# Patient Record
Sex: FEMALE | Race: WHITE | NOT HISPANIC OR LATINO | Employment: UNEMPLOYED | URBAN - METROPOLITAN AREA
[De-identification: names, ages, dates, MRNs, and addresses within clinical notes are randomized per-mention and may not be internally consistent; named-entity substitution may affect disease eponyms.]

---

## 2023-08-16 LAB
ALANINE AMINOTRANSFERASE (SGPT) (U/L) IN SER/PLAS: 23 U/L (ref 7–45)
ALBUMIN (G/DL) IN SER/PLAS: 4.1 G/DL (ref 3.4–5)
ALKALINE PHOSPHATASE (U/L) IN SER/PLAS: 83 U/L (ref 33–110)
ANION GAP IN SER/PLAS: 11 MMOL/L (ref 10–20)
ASPARTATE AMINOTRANSFERASE (SGOT) (U/L) IN SER/PLAS: 17 U/L (ref 9–39)
BASOPHILS (10*3/UL) IN BLOOD BY AUTOMATED COUNT: 0.04 X10E9/L (ref 0–0.1)
BASOPHILS/100 LEUKOCYTES IN BLOOD BY AUTOMATED COUNT: 0.7 % (ref 0–2)
BILIRUBIN TOTAL (MG/DL) IN SER/PLAS: 0.6 MG/DL (ref 0–1.2)
CALCIUM (MG/DL) IN SER/PLAS: 9.6 MG/DL (ref 8.6–10.3)
CARBON DIOXIDE, TOTAL (MMOL/L) IN SER/PLAS: 27 MMOL/L (ref 21–32)
CHLORIDE (MMOL/L) IN SER/PLAS: 105 MMOL/L (ref 98–107)
CHOLESTEROL (MG/DL) IN SER/PLAS: 158 MG/DL (ref 0–199)
CHOLESTEROL IN HDL (MG/DL) IN SER/PLAS: 43.6 MG/DL
CHOLESTEROL/HDL RATIO: 3.6
CREATININE (MG/DL) IN SER/PLAS: 0.59 MG/DL (ref 0.5–1.05)
EOSINOPHILS (10*3/UL) IN BLOOD BY AUTOMATED COUNT: 0.08 X10E9/L (ref 0–0.7)
EOSINOPHILS/100 LEUKOCYTES IN BLOOD BY AUTOMATED COUNT: 1.4 % (ref 0–6)
ERYTHROCYTE DISTRIBUTION WIDTH (RATIO) BY AUTOMATED COUNT: 14.9 % (ref 11.5–14.5)
ERYTHROCYTE MEAN CORPUSCULAR HEMOGLOBIN CONCENTRATION (G/DL) BY AUTOMATED: 31 G/DL (ref 32–36)
ERYTHROCYTE MEAN CORPUSCULAR VOLUME (FL) BY AUTOMATED COUNT: 80 FL (ref 80–100)
ERYTHROCYTES (10*6/UL) IN BLOOD BY AUTOMATED COUNT: 5.22 X10E12/L (ref 4–5.2)
GFR FEMALE: >90 ML/MIN/1.73M2
GLUCOSE (MG/DL) IN SER/PLAS: 121 MG/DL (ref 74–99)
HEMATOCRIT (%) IN BLOOD BY AUTOMATED COUNT: 41.6 % (ref 36–46)
HEMOGLOBIN (G/DL) IN BLOOD: 12.9 G/DL (ref 12–16)
IMMATURE GRANULOCYTES/100 LEUKOCYTES IN BLOOD BY AUTOMATED COUNT: 0.2 % (ref 0–0.9)
LDL: 89 MG/DL (ref 0–99)
LEUKOCYTES (10*3/UL) IN BLOOD BY AUTOMATED COUNT: 5.9 X10E9/L (ref 4.4–11.3)
LYMPHOCYTES (10*3/UL) IN BLOOD BY AUTOMATED COUNT: 2.08 X10E9/L (ref 1.2–4.8)
LYMPHOCYTES/100 LEUKOCYTES IN BLOOD BY AUTOMATED COUNT: 35.6 % (ref 13–44)
MONOCYTES (10*3/UL) IN BLOOD BY AUTOMATED COUNT: 0.46 X10E9/L (ref 0.1–1)
MONOCYTES/100 LEUKOCYTES IN BLOOD BY AUTOMATED COUNT: 7.9 % (ref 2–10)
NEUTROPHILS (10*3/UL) IN BLOOD BY AUTOMATED COUNT: 3.18 X10E9/L (ref 1.2–7.7)
NEUTROPHILS/100 LEUKOCYTES IN BLOOD BY AUTOMATED COUNT: 54.2 % (ref 40–80)
NRBC (PER 100 WBCS) BY AUTOMATED COUNT: 0 /100 WBC (ref 0–0)
PLATELETS (10*3/UL) IN BLOOD AUTOMATED COUNT: 209 X10E9/L (ref 150–450)
POTASSIUM (MMOL/L) IN SER/PLAS: 4 MMOL/L (ref 3.5–5.3)
PROTEIN TOTAL: 7.2 G/DL (ref 6.4–8.2)
SODIUM (MMOL/L) IN SER/PLAS: 139 MMOL/L (ref 136–145)
TRIGLYCERIDE (MG/DL) IN SER/PLAS: 126 MG/DL (ref 0–149)
UREA NITROGEN (MG/DL) IN SER/PLAS: 15 MG/DL (ref 6–23)
VLDL: 25 MG/DL (ref 0–40)

## 2023-10-04 ENCOUNTER — TREATMENT (OUTPATIENT)
Dept: PHYSICAL THERAPY | Facility: CLINIC | Age: 53
End: 2023-10-04
Payer: COMMERCIAL

## 2023-10-04 DIAGNOSIS — M79.671 PAIN IN BOTH FEET: ICD-10-CM

## 2023-10-04 DIAGNOSIS — M54.16 LUMBAR RADICULOPATHY: Primary | ICD-10-CM

## 2023-10-04 DIAGNOSIS — M79.672 PAIN IN BOTH FEET: ICD-10-CM

## 2023-10-04 DIAGNOSIS — M75.52 BURSITIS OF LEFT SHOULDER: ICD-10-CM

## 2023-10-04 DIAGNOSIS — S43.439D: ICD-10-CM

## 2023-10-04 DIAGNOSIS — M25.512 ACUTE PAIN OF LEFT SHOULDER: ICD-10-CM

## 2023-10-04 PROCEDURE — 97035 APP MDLTY 1+ULTRASOUND EA 15: CPT | Mod: GP

## 2023-10-04 PROCEDURE — 97140 MANUAL THERAPY 1/> REGIONS: CPT | Mod: GP

## 2023-10-04 ASSESSMENT — PAIN SCALES - GENERAL: PAINLEVEL_OUTOF10: 5 - MODERATE PAIN

## 2023-10-04 ASSESSMENT — PAIN - FUNCTIONAL ASSESSMENT: PAIN_FUNCTIONAL_ASSESSMENT: 0-10

## 2023-10-04 ASSESSMENT — PAIN DESCRIPTION - DESCRIPTORS: DESCRIPTORS: SHARP;ACHING

## 2023-10-04 NOTE — PROGRESS NOTES
Physical Therapy    Physical Therapy Treatment    Patient Name: Ludmila Chowdary  MRN: [id number]  Today's Date: 10/4/2023  Time Calculation  Start Time: 0220  Stop Time: 0250  Time Calculation (min): 30 min      Visit #: 13  Insurance Reviewed (per information provided by  pre-cert team)  Authorization required:  Y/N  Approved # of visits: 12  Authorization date range:  11  Preferred Name:    Script:      Assessment:  PT Assessment  PT Assessment Results: Pain (No adversity to US treatment or manual treatment)  Assessment Comment:  (Patient did well with today's treatment without adverse effects from conservative treatment of manual, modalities, and taping. Pt. will benefit from continued skilled PT to address all areas of pain from land and water.)    Plan:  OP PT Plan  Treatment/Interventions: Aquatic therapy, Manual therapy, Neuromuscular re-education, Taping techniques, Therapeutic exercises, Ultrasound  PT Plan: Skilled PT  PT Frequency: 2 times per week  Duration: 6 weeks  Plan of Care Agreement: Patient  Continue with poc as documented in the [employer] system     Current Problem  1. Lumbar radiculopathy        2. Bursitis of left shoulder  Referral to Physical Therapy      3. Pain in both feet        4. Acute pain of left shoulder        5. Labral tear of shoulder, unspecified laterality, subsequent encounter            Subjective   General Limited treatment time today due to Epic issues from registration stand point.  Chose to work on right foot on land today per patient request, as this is her most painful area today.     Precautions: No falls since last visit.  Vital Signs     Pain  Pain Assessment: 0-10  Pain Score: 5 - Moderate pain  Pain Type: Chronic pain (Specifically today the right anterior and medial ankle)  Pain Location: Ankle  Pain Descriptors: Sharp, Aching  Effect of Pain on Daily Activities:  (Worsened with standing and baking/cleaning)    Objective   Observe area of pain to be left arch which has  severe pes planus   Palpable tenderness to left medial arch and along the PTT tendon region of left foot.    Treatments:  Therapeutic Exercise  Therapeutic Exercise Performed: Yes  Therapeutic Exercise Activity 1: towel scrunches, 10x  Therapeutic Exercise Activity 2: Seated ball squeeze between heels while doing heel raises, 10x (Total ex. 5 min)    Manual Therapy  Manual Therapy Activity 1: STM to right medial arch/ankle and up into posterior tibial tendon x 15  Manual Therapy Activity 2: Applied trial of arch taping    Modalities  Modality 1: Timed Ultrasound (1.5 w/cm2, pulsed setting to right medial arch and into distal posterior tibial tendon)    OP EDUCATION:  Education  Individual(s) Educated: Patient  Education Provided: Other (Exercises to perform at home for foot and ankle)

## 2023-10-05 PROBLEM — B96.81 HELICOBACTER PYLORI GASTRITIS: Status: ACTIVE | Noted: 2023-10-05

## 2023-10-05 PROBLEM — M19.90 DJD (DEGENERATIVE JOINT DISEASE): Status: ACTIVE | Noted: 2023-10-05

## 2023-10-05 PROBLEM — M54.2 CHRONIC NECK PAIN: Status: ACTIVE | Noted: 2023-10-05

## 2023-10-05 PROBLEM — M72.2 PLANTAR FASCIITIS, RIGHT: Status: ACTIVE | Noted: 2023-10-05

## 2023-10-05 PROBLEM — K21.9 GERD (GASTROESOPHAGEAL REFLUX DISEASE): Status: ACTIVE | Noted: 2023-10-05

## 2023-10-05 PROBLEM — E66.811 CLASS 1 OBESITY WITH ALVEOLAR HYPOVENTILATION AND BODY MASS INDEX (BMI) OF 31.0 TO 31.9 IN ADULT: Status: ACTIVE | Noted: 2023-10-05

## 2023-10-05 PROBLEM — M79.673 HEEL PAIN: Status: ACTIVE | Noted: 2023-10-05

## 2023-10-05 PROBLEM — H16.223 KERATOCONJUNCTIVITIS SICCA OF BOTH EYES NOT DUE TO SJOGREN'S SYNDROME: Status: ACTIVE | Noted: 2023-10-05

## 2023-10-05 PROBLEM — D50.9 IRON DEFICIENCY ANEMIA: Status: ACTIVE | Noted: 2023-10-05

## 2023-10-05 PROBLEM — M79.10 MYALGIA: Status: ACTIVE | Noted: 2023-10-05

## 2023-10-05 PROBLEM — M75.52 ACUTE SHOULDER BURSITIS, LEFT: Status: ACTIVE | Noted: 2023-10-05

## 2023-10-05 PROBLEM — M25.561 BILATERAL KNEE PAIN: Status: ACTIVE | Noted: 2021-09-30

## 2023-10-05 PROBLEM — H02.88B MEIBOMIAN GLAND DYSFUNCTION (MGD) OF UPPER AND LOWER EYELID OF LEFT EYE: Status: ACTIVE | Noted: 2023-10-05

## 2023-10-05 PROBLEM — I10 BENIGN ESSENTIAL HYPERTENSION: Status: ACTIVE | Noted: 2023-10-05

## 2023-10-05 PROBLEM — M12.9 ARTHROPATHY: Status: ACTIVE | Noted: 2023-10-05

## 2023-10-05 PROBLEM — J01.90 ACUTE SINUSITIS: Status: ACTIVE | Noted: 2023-10-05

## 2023-10-05 PROBLEM — G47.00 INSOMNIA: Status: ACTIVE | Noted: 2023-10-05

## 2023-10-05 PROBLEM — M25.661 DECREASED RANGE OF MOTION (ROM) OF RIGHT KNEE: Status: ACTIVE | Noted: 2021-05-30

## 2023-10-05 PROBLEM — G89.29 CHRONIC BILATERAL LOW BACK PAIN WITHOUT SCIATICA: Status: ACTIVE | Noted: 2020-11-09

## 2023-10-05 PROBLEM — H92.09 OTALGIA: Status: ACTIVE | Noted: 2023-10-05

## 2023-10-05 PROBLEM — E78.00 HYPERCHOLESTEREMIA: Status: ACTIVE | Noted: 2023-10-05

## 2023-10-05 PROBLEM — R06.09 DOE (DYSPNEA ON EXERTION): Status: ACTIVE | Noted: 2023-10-05

## 2023-10-05 PROBLEM — M79.672 FOOT PAIN, BILATERAL: Status: ACTIVE | Noted: 2023-10-05

## 2023-10-05 PROBLEM — G89.29 CHRONIC NECK PAIN: Status: ACTIVE | Noted: 2023-10-05

## 2023-10-05 PROBLEM — E66.9 OBESITY, CLASS I, BMI 30-34.9: Status: ACTIVE | Noted: 2019-03-15

## 2023-10-05 PROBLEM — R26.89 ANTALGIC GAIT: Status: ACTIVE | Noted: 2023-10-05

## 2023-10-05 PROBLEM — K29.70 HELICOBACTER PYLORI GASTRITIS: Status: ACTIVE | Noted: 2023-10-05

## 2023-10-05 PROBLEM — M54.9 BACK PAIN: Status: ACTIVE | Noted: 2023-10-05

## 2023-10-05 PROBLEM — R35.89 POLYURIA: Status: ACTIVE | Noted: 2023-10-05

## 2023-10-05 PROBLEM — M94.0 COSTOCHONDRITIS: Status: ACTIVE | Noted: 2023-10-05

## 2023-10-05 PROBLEM — H02.88A MEIBOMIAN GLAND DYSFUNCTION (MGD) OF UPPER AND LOWER EYELID OF RIGHT EYE: Status: ACTIVE | Noted: 2023-10-05

## 2023-10-05 PROBLEM — E66.2 CLASS 1 OBESITY WITH ALVEOLAR HYPOVENTILATION AND BODY MASS INDEX (BMI) OF 31.0 TO 31.9 IN ADULT (MULTI): Status: ACTIVE | Noted: 2023-10-05

## 2023-10-05 PROBLEM — M77.30 CALCANEAL SPUR: Status: ACTIVE | Noted: 2023-10-05

## 2023-10-05 PROBLEM — D64.9 ANEMIA: Status: ACTIVE | Noted: 2023-10-05

## 2023-10-05 PROBLEM — R53.1 WEAKNESS: Status: ACTIVE | Noted: 2023-10-05

## 2023-10-05 PROBLEM — E11.9 DIABETES MELLITUS, NEW ONSET (MULTI): Status: ACTIVE | Noted: 2023-10-05

## 2023-10-05 PROBLEM — M25.562 BILATERAL KNEE PAIN: Status: ACTIVE | Noted: 2021-09-30

## 2023-10-05 PROBLEM — R32 URINE INCONTINENCE: Status: ACTIVE | Noted: 2023-10-05

## 2023-10-05 PROBLEM — M47.812 CERVICAL SPONDYLOSIS: Status: ACTIVE | Noted: 2023-10-05

## 2023-10-05 PROBLEM — E55.9 VITAMIN D DEFICIENCY: Status: ACTIVE | Noted: 2023-10-05

## 2023-10-05 PROBLEM — M54.50 CHRONIC BILATERAL LOW BACK PAIN WITHOUT SCIATICA: Status: ACTIVE | Noted: 2020-11-09

## 2023-10-05 PROBLEM — H52.13 MYOPIA OF BOTH EYES: Status: ACTIVE | Noted: 2023-10-05

## 2023-10-05 PROBLEM — R30.0 DYSURIA: Status: ACTIVE | Noted: 2023-10-05

## 2023-10-05 PROBLEM — M79.671 FOOT PAIN, BILATERAL: Status: ACTIVE | Noted: 2023-10-05

## 2023-10-05 PROBLEM — M25.50 POLYARTHRALGIA: Status: ACTIVE | Noted: 2023-10-05

## 2023-10-05 PROBLEM — M25.569 KNEE PAIN: Status: ACTIVE | Noted: 2021-05-30

## 2023-10-05 PROBLEM — M75.81 TENDINITIS OF RIGHT ROTATOR CUFF: Status: ACTIVE | Noted: 2023-10-05

## 2023-10-05 PROBLEM — M72.2 PLANTAR FASCIITIS, LEFT: Status: ACTIVE | Noted: 2023-10-05

## 2023-10-05 PROBLEM — M17.0 ARTHRITIS OF BOTH KNEES: Status: ACTIVE | Noted: 2023-10-05

## 2023-10-05 PROBLEM — H35.033 HYPERTENSIVE RETINOPATHY OF BOTH EYES, GRADE 1: Status: ACTIVE | Noted: 2023-10-05

## 2023-10-05 PROBLEM — M76.821 POSTERIOR TIBIAL TENDINITIS, RIGHT LEG: Status: ACTIVE | Noted: 2023-10-05

## 2023-10-05 PROBLEM — M47.27 LUMBOSACRAL RADICULOPATHY DUE TO OSTEOARTHRITIS OF SPINE: Status: ACTIVE | Noted: 2023-10-05

## 2023-10-05 PROBLEM — R26.9 GAIT DIFFICULTY: Status: ACTIVE | Noted: 2021-05-30

## 2023-10-05 PROBLEM — M76.822 POSTERIOR TIBIAL TENDINITIS, LEFT: Status: ACTIVE | Noted: 2023-10-05

## 2023-10-05 PROBLEM — J30.9 ALLERGIC RHINITIS: Status: ACTIVE | Noted: 2023-10-05

## 2023-10-05 PROBLEM — M17.12 PRIMARY OSTEOARTHRITIS OF LEFT KNEE: Status: ACTIVE | Noted: 2021-05-30

## 2023-10-05 PROBLEM — E78.5 HYPERLIPIDEMIA: Status: ACTIVE | Noted: 2023-10-05

## 2023-10-05 PROBLEM — R13.19 ESOPHAGEAL DYSPHAGIA: Status: ACTIVE | Noted: 2023-10-05

## 2023-10-05 PROBLEM — M21.41 ACQUIRED PES PLANUS OF BOTH FEET: Status: ACTIVE | Noted: 2023-10-05

## 2023-10-05 PROBLEM — M21.40 FLAT FOOT: Status: ACTIVE | Noted: 2023-10-05

## 2023-10-05 PROBLEM — E66.811 OBESITY, CLASS I, BMI 30-34.9: Status: ACTIVE | Noted: 2019-03-15

## 2023-10-05 PROBLEM — M54.12 CERVICAL RADICULITIS: Status: ACTIVE | Noted: 2023-10-05

## 2023-10-05 PROBLEM — M06.4: Status: ACTIVE | Noted: 2023-10-05

## 2023-10-05 PROBLEM — M21.42 ACQUIRED PES PLANUS OF BOTH FEET: Status: ACTIVE | Noted: 2023-10-05

## 2023-10-05 PROBLEM — R07.9 CHEST PAIN: Status: ACTIVE | Noted: 2023-10-05

## 2023-10-05 PROBLEM — R29.898 DECREASED STRENGTH INVOLVING KNEE JOINT: Status: ACTIVE | Noted: 2021-05-30

## 2023-10-05 PROBLEM — H52.03 HYPERMETROPIA OF BOTH EYES: Status: ACTIVE | Noted: 2023-10-05

## 2023-10-05 PROBLEM — H25.13 CATARACT, NUCLEAR SCLEROTIC, BOTH EYES: Status: ACTIVE | Noted: 2023-10-05

## 2023-10-05 RX ORDER — DICLOFENAC SODIUM 20 MG/G
2 SOLUTION TOPICAL EVERY 12 HOURS PRN
COMMUNITY
Start: 2017-12-26 | End: 2023-11-27 | Stop reason: WASHOUT

## 2023-10-05 RX ORDER — ALBUTEROL SULFATE 90 UG/1
2 AEROSOL, METERED RESPIRATORY (INHALATION) EVERY 8 HOURS PRN
COMMUNITY
Start: 2020-07-28 | End: 2024-03-01 | Stop reason: ALTCHOICE

## 2023-10-05 RX ORDER — CEFUROXIME AXETIL 500 MG/1
500 TABLET ORAL EVERY 12 HOURS
COMMUNITY
Start: 2023-08-09 | End: 2023-11-16 | Stop reason: ALTCHOICE

## 2023-10-05 RX ORDER — LISINOPRIL 10 MG/1
TABLET ORAL
COMMUNITY
End: 2024-03-01 | Stop reason: ALTCHOICE

## 2023-10-05 RX ORDER — LIDOCAINE HYDROCHLORIDE 20 MG/ML
SOLUTION ORAL; TOPICAL
COMMUNITY
Start: 2023-01-18 | End: 2024-03-01 | Stop reason: ALTCHOICE

## 2023-10-05 RX ORDER — BETAMETHASONE DIPROPIONATE 0.5 MG/G
CREAM TOPICAL 2 TIMES DAILY
COMMUNITY
Start: 2019-06-19 | End: 2024-03-01 | Stop reason: ALTCHOICE

## 2023-10-05 RX ORDER — OMEPRAZOLE 40 MG/1
1 CAPSULE, DELAYED RELEASE ORAL DAILY
COMMUNITY
Start: 2017-02-01 | End: 2023-11-16 | Stop reason: ALTCHOICE

## 2023-10-05 RX ORDER — MELOXICAM 15 MG/1
1 TABLET ORAL DAILY
COMMUNITY
Start: 2022-03-14 | End: 2023-11-27 | Stop reason: WASHOUT

## 2023-10-05 RX ORDER — ATENOLOL 50 MG/1
50 TABLET ORAL DAILY
COMMUNITY
Start: 2023-07-25 | End: 2023-10-23 | Stop reason: SDUPTHER

## 2023-10-05 RX ORDER — ERGOCALCIFEROL 1.25 MG/1
CAPSULE ORAL
COMMUNITY
Start: 2019-08-27

## 2023-10-05 RX ORDER — TOLTERODINE TARTRATE 2 MG/1
1 TABLET, EXTENDED RELEASE ORAL NIGHTLY
COMMUNITY
Start: 2023-01-18 | End: 2024-03-01 | Stop reason: ALTCHOICE

## 2023-10-05 RX ORDER — CETIRIZINE HYDROCHLORIDE 10 MG/1
10 TABLET ORAL DAILY PRN
COMMUNITY
Start: 2020-03-16 | End: 2023-11-27 | Stop reason: WASHOUT

## 2023-10-05 RX ORDER — PANTOPRAZOLE SODIUM 40 MG/1
40 TABLET, DELAYED RELEASE ORAL DAILY
COMMUNITY
End: 2024-05-01 | Stop reason: SINTOL

## 2023-10-05 RX ORDER — CHOLECALCIFEROL (VITAMIN D3) 50 MCG
TABLET ORAL
COMMUNITY
End: 2023-11-27 | Stop reason: WASHOUT

## 2023-10-05 RX ORDER — DOXYCYCLINE HYCLATE 100 MG
1 TABLET ORAL EVERY 12 HOURS
COMMUNITY
Start: 2022-12-05 | End: 2023-11-27 | Stop reason: WASHOUT

## 2023-10-05 RX ORDER — SOD SULF/POT CHLORIDE/MAG SULF 1.479 G
TABLET ORAL
COMMUNITY
End: 2024-03-01 | Stop reason: ALTCHOICE

## 2023-10-05 RX ORDER — CALCIUM CITRATE/VITAMIN D3 200MG-6.25
TABLET ORAL DAILY
COMMUNITY
Start: 2020-04-24

## 2023-10-05 RX ORDER — METFORMIN HYDROCHLORIDE 500 MG/1
500 TABLET ORAL EVERY 12 HOURS
COMMUNITY
End: 2024-03-01 | Stop reason: ALTCHOICE

## 2023-10-05 RX ORDER — AMOXICILLIN AND CLAVULANATE POTASSIUM 875; 125 MG/1; MG/1
1 TABLET, FILM COATED ORAL 2 TIMES DAILY
COMMUNITY
Start: 2023-05-04 | End: 2023-10-23 | Stop reason: ALTCHOICE

## 2023-10-05 RX ORDER — GUAIFENESIN 400 MG/1
TABLET ORAL
COMMUNITY
Start: 2023-05-05 | End: 2024-03-01 | Stop reason: ALTCHOICE

## 2023-10-05 RX ORDER — AMLODIPINE BESYLATE 5 MG/1
5 TABLET ORAL
COMMUNITY
Start: 2020-11-04 | End: 2023-10-23 | Stop reason: SDUPTHER

## 2023-10-05 RX ORDER — OMEPRAZOLE 20 MG/1
20 CAPSULE, DELAYED RELEASE ORAL AS NEEDED
COMMUNITY
End: 2023-11-16 | Stop reason: ALTCHOICE

## 2023-10-05 RX ORDER — KETOCONAZOLE 20 MG/ML
SHAMPOO, SUSPENSION TOPICAL
COMMUNITY
Start: 2014-11-13 | End: 2024-03-01 | Stop reason: ALTCHOICE

## 2023-10-05 RX ORDER — ASPIRIN 81 MG/1
1-2 TABLET ORAL DAILY
COMMUNITY
Start: 2013-08-13 | End: 2023-11-27 | Stop reason: WASHOUT

## 2023-10-05 RX ORDER — LIDOCAINE 4 G/100G
PATCH TOPICAL DAILY PRN
COMMUNITY

## 2023-10-05 RX ORDER — HYDROCHLOROTHIAZIDE 25 MG/1
TABLET ORAL
COMMUNITY
Start: 2016-10-18 | End: 2024-03-01 | Stop reason: ALTCHOICE

## 2023-10-05 RX ORDER — TRAZODONE HYDROCHLORIDE 50 MG/1
1 TABLET ORAL NIGHTLY
COMMUNITY
Start: 2020-10-20 | End: 2024-03-06 | Stop reason: WASHOUT

## 2023-10-05 RX ORDER — PROPYLENE GLYCOL 0.06 MG/ML
1 SOLUTION/ DROPS OPHTHALMIC 3 TIMES DAILY
COMMUNITY
End: 2024-03-01 | Stop reason: ALTCHOICE

## 2023-10-05 RX ORDER — ATORVASTATIN CALCIUM 10 MG/1
10 TABLET, FILM COATED ORAL DAILY
COMMUNITY
End: 2024-03-14 | Stop reason: SDUPTHER

## 2023-10-05 RX ORDER — AMLODIPINE BESYLATE 10 MG/1
10 TABLET ORAL DAILY
COMMUNITY
Start: 2023-08-09 | End: 2024-02-26

## 2023-10-05 RX ORDER — PREDNISONE 20 MG/1
10 TABLET ORAL DAILY
COMMUNITY
Start: 2019-11-15 | End: 2024-03-01 | Stop reason: ALTCHOICE

## 2023-10-05 RX ORDER — LIDOCAINE 50 MG/G
1 PATCH TOPICAL
COMMUNITY
Start: 2023-09-15 | End: 2024-03-01 | Stop reason: ALTCHOICE

## 2023-10-05 RX ORDER — ATENOLOL 25 MG/1
25 TABLET ORAL DAILY
COMMUNITY
End: 2024-02-26

## 2023-10-05 RX ORDER — DICLOFENAC SODIUM 75 MG/1
1 TABLET, DELAYED RELEASE ORAL 2 TIMES DAILY
COMMUNITY
Start: 2022-08-16 | End: 2023-11-27 | Stop reason: WASHOUT

## 2023-10-05 RX ORDER — MONTELUKAST SODIUM 10 MG/1
10 TABLET ORAL
COMMUNITY
Start: 2020-11-09 | End: 2023-12-19 | Stop reason: SDUPTHER

## 2023-10-05 RX ORDER — FLUTICASONE PROPIONATE 50 MCG
2 SPRAY, SUSPENSION (ML) NASAL DAILY
COMMUNITY
End: 2024-03-01 | Stop reason: ALTCHOICE

## 2023-10-06 ENCOUNTER — TREATMENT (OUTPATIENT)
Dept: PHYSICAL THERAPY | Facility: CLINIC | Age: 53
End: 2023-10-06
Payer: COMMERCIAL

## 2023-10-06 DIAGNOSIS — S43.439D: Primary | ICD-10-CM

## 2023-10-06 DIAGNOSIS — M79.671 FOOT PAIN, BILATERAL: ICD-10-CM

## 2023-10-06 DIAGNOSIS — M75.52 ACUTE SHOULDER BURSITIS, LEFT: ICD-10-CM

## 2023-10-06 DIAGNOSIS — M79.672 FOOT PAIN, BILATERAL: ICD-10-CM

## 2023-10-06 DIAGNOSIS — M47.27 LUMBOSACRAL RADICULOPATHY DUE TO OSTEOARTHRITIS OF SPINE: ICD-10-CM

## 2023-10-06 PROCEDURE — 97140 MANUAL THERAPY 1/> REGIONS: CPT | Mod: GP

## 2023-10-06 PROCEDURE — 97113 AQUATIC THERAPY/EXERCISES: CPT | Mod: GP

## 2023-10-06 NOTE — PROGRESS NOTES
Physical Therapy    Physical Therapy Treatment    Patient Name: Ludmila Chowdary  MRN: [id number]  Today's Date: 10/6/2023  Time Calculation  Start Time: 1015  Stop Time: 1110  Time Calculation (min): 55 min  Visit #: 14 (Visit #2 on recent auth)  Insurance Reviewed (per information provided by  pre-cert team)  Authorization required:  Y  Approved # of visits: 12  Authorization date range:  9/12/2023 to 11/3/2023    Key Learner:  self  Preferred Learning:  Printed Materials/Demonstration/Discussion  Learning Barriers:  none  No Fall Risk  Preferred language: english  Assessment:  Pt completes pool program without any difficulties and progresses level of challenge of DLS ex by walking instead of standing    Plan:  Continue to tape feet as it benefits patient. At next land session, add Thera band exercises.  Continue with poc as documented in the legacy system   Current Problem  1. Labral tear of shoulder, unspecified laterality, subsequent encounter        2. Foot pain, bilateral        3. Lumbosacral radiculopathy due to osteoarthritis of spine        4. Acute shoulder bursitis, left            Subjective   General   Pt notes that the tape that was applied to right foot last visit helped her pain.     Precautions No risk of falls     Pain   0 Right foot  7 Left knee  5-6 Right shoulder      Objective     Posture  Pt is able to stand against current today while doing DLS ex such as Aquasizer push up/down, and forward/backward     Treatments:  Aqua 45 min/Manual 10 min  TM=Treadmill, T=Speed, C=Current, BTW=Back To Wall, NV=Next Visit, NP=Not Performed    **Floater**  **Does not want to go to a community pool after discharge**    TM=4 C=10 FWD 4 minutes  TM=3 C=10 Retro 3 minutes  C=10 Lateral Ambulation x 4 each  Open board push/pull, up/down x 10 each   Marching across pool, no current, and no barbell assist  Walking DLS w/ BUE closed paddles  flex/ext, ABD/ADD symmetrical x 10 each   Deep End w/ float belt  Cycle 3  "minutes  Scissors, Cross Country 1 minute each   Hang 3 minutes   Stair stretches: Hamstrings/hip flexors/calves  30\"/2 zoe      Manual: applied navicular sling tape to both feet for arch support. (10 minutes)  "

## 2023-10-09 ENCOUNTER — TREATMENT (OUTPATIENT)
Dept: PHYSICAL THERAPY | Facility: CLINIC | Age: 53
End: 2023-10-09
Payer: COMMERCIAL

## 2023-10-09 DIAGNOSIS — S43.439D: Primary | ICD-10-CM

## 2023-10-09 DIAGNOSIS — M75.52 ACUTE SHOULDER BURSITIS, LEFT: ICD-10-CM

## 2023-10-09 DIAGNOSIS — M25.512 ACUTE PAIN OF LEFT SHOULDER: ICD-10-CM

## 2023-10-09 DIAGNOSIS — M47.27 LUMBOSACRAL RADICULOPATHY DUE TO OSTEOARTHRITIS OF SPINE: ICD-10-CM

## 2023-10-09 PROCEDURE — 97140 MANUAL THERAPY 1/> REGIONS: CPT | Mod: GP

## 2023-10-09 PROCEDURE — 97110 THERAPEUTIC EXERCISES: CPT | Mod: GP

## 2023-10-09 NOTE — PROGRESS NOTES
"Physical Therapy    Physical Therapy Treatment    Patient Name: Ludmila Chowdary  MRN: [id number]  Today's Date: 10/10/2023  Time Calculation  Start Time: 0236  Stop Time: 0320  Time Calculation (min): 44 min      Visit #: 3 (for most recent certification)  Insurance Reviewed (per information provided by  pre-cert team)  Authorization required:  Y  Approved # of visits: 12  Authorization date range: 9/12/2023 to 11/03/2023    Preferred Name:  Ludmila    Assessment:  Pt. Increased ex. On land today to address all areas of pain that she experiences. Pt will benefit from additional skilled Pt to further promote strength, mobility, and posture.     Plan:  Do aquatics next visit, and progress challenge of ex in water and then on land the following visit.  Continue with poc as documented in the legacy system     Current Problem  1. Labral tear of shoulder, unspecified laterality, subsequent encounter        2. Acute pain of left shoulder        3. Acute shoulder bursitis, left        4. Lumbosacral radiculopathy due to osteoarthritis of spine            Subjective    Pt wore brace for pes planus in right shoe today  Precautions: No falls since last visit.    Pain  Pain range from 5-7 of right shoulder, left knee, and right foot, dull/achy but can be sharp with increased activity in standing    Objective   Posture in standing requires VC's to be more upright with shoulders back  Pt sits in flexed rounded posture    Treatments: 30 min.  Nu Step L2, 8 minutes  Standing HS and hip flexor stretch on steps, 30\" x 2 R/L ea  Shoulder pulleys, flexion 2 min  Mid Rows and zoe. Shoulder ext, green, 2x10 each   Seated resisted plantarflexion, green, 20 x  Standing TKE green 20x    Manual: STM to right arch of foot, anterior ankle, and medial lower leg. Applied tape for arch support with \"Bhatti tape\" bilaterally; 10 min.             "

## 2023-10-12 ENCOUNTER — APPOINTMENT (OUTPATIENT)
Dept: PHYSICAL THERAPY | Facility: CLINIC | Age: 53
End: 2023-10-12
Payer: COMMERCIAL

## 2023-10-16 ENCOUNTER — TREATMENT (OUTPATIENT)
Dept: PHYSICAL THERAPY | Facility: CLINIC | Age: 53
End: 2023-10-16
Payer: COMMERCIAL

## 2023-10-16 DIAGNOSIS — M75.52 ACUTE SHOULDER BURSITIS, LEFT: ICD-10-CM

## 2023-10-16 DIAGNOSIS — M47.27 LUMBOSACRAL RADICULOPATHY DUE TO OSTEOARTHRITIS OF SPINE: ICD-10-CM

## 2023-10-16 DIAGNOSIS — M79.672 FOOT PAIN, BILATERAL: ICD-10-CM

## 2023-10-16 DIAGNOSIS — S43.439D: Primary | ICD-10-CM

## 2023-10-16 DIAGNOSIS — G89.29 CHRONIC LEFT SHOULDER PAIN: ICD-10-CM

## 2023-10-16 DIAGNOSIS — M25.512 CHRONIC LEFT SHOULDER PAIN: ICD-10-CM

## 2023-10-16 DIAGNOSIS — M79.671 FOOT PAIN, BILATERAL: ICD-10-CM

## 2023-10-16 PROCEDURE — 97110 THERAPEUTIC EXERCISES: CPT | Mod: GP

## 2023-10-16 NOTE — PROGRESS NOTES
"Physical Therapy    Physical Therapy Treatment    Patient Name: Ludmila Chowdary  MRN: [id number]  Today's Date: 10/18/2023  Time Calculation  Start Time: 0245  Stop Time: 0325  Time Calculation (min): 40 min    Visit #: 4(for most recent certification)  Insurance Reviewed (per information provided by  pre-cert team)  Authorization required:  Y  Approved # of visits: 12  Authorization date range: 9/12/2023 to 11/03/2023    Preferred Name:  Ludmila    Assessment:  Pt is completing well rounded ex for the multiple body parts involved without exacerbation of symptoms. Pt may benefit from first receiving taping to her feet and the try static CKC ex at next land session.    Plan:  Do aquatics next visit, and progress challenge of ex in water and then on land the following visit.  Continue with poc as documented in the legacy system     Current Problem  1. Labral tear of shoulder, unspecified laterality, subsequent encounter        2. Chronic left shoulder pain        3. Acute shoulder bursitis, left        4. Lumbosacral radiculopathy due to osteoarthritis of spine        5. Foot pain, bilateral            Subjective   Pt. arrives 11 minutes late today  Precautions: No falls since last visit.    Pain  Pain 6 bilateral shoulders, 7 left knee, and 6 bilateral foot, dull/achy but can be sharp with increased activity in standing    Objective   Pt gaining increased strength of left knee as she completes increased resistive ex for quad and hamstrings.   Fair posture awareness with standing ex requiring cues to engage scapular mm    Treatments: 38 min.  Nu Step L2, 8 minutes  Standing HS and hip flexor stretch on steps, 30\" x 2 R/L ea NP  Shoulder pulleys, flexion 2 min  Mid Rows, high zoe. Shoulder ext, green, 2x10 each   Seated resisted plantarflexion, green, 20 x  Seated LAQ and HS, pl 1, seat 3, 20x each    Manual: 2 min to apply tape to right foot     "

## 2023-10-19 ENCOUNTER — TREATMENT (OUTPATIENT)
Dept: PHYSICAL THERAPY | Facility: CLINIC | Age: 53
End: 2023-10-19
Payer: COMMERCIAL

## 2023-10-19 DIAGNOSIS — M25.512 CHRONIC LEFT SHOULDER PAIN: ICD-10-CM

## 2023-10-19 DIAGNOSIS — M79.672 FOOT PAIN, BILATERAL: ICD-10-CM

## 2023-10-19 DIAGNOSIS — M47.27 LUMBOSACRAL RADICULOPATHY DUE TO OSTEOARTHRITIS OF SPINE: ICD-10-CM

## 2023-10-19 DIAGNOSIS — M75.52 ACUTE SHOULDER BURSITIS, LEFT: ICD-10-CM

## 2023-10-19 DIAGNOSIS — G89.29 CHRONIC LEFT SHOULDER PAIN: ICD-10-CM

## 2023-10-19 DIAGNOSIS — S43.439D: Primary | ICD-10-CM

## 2023-10-19 DIAGNOSIS — M79.671 FOOT PAIN, BILATERAL: ICD-10-CM

## 2023-10-19 PROCEDURE — 97113 AQUATIC THERAPY/EXERCISES: CPT | Mod: GP,CQ

## 2023-10-19 NOTE — PROGRESS NOTES
Physical Therapy    Physical Therapy Treatment             Patient Name: Ludmila Chowdary  MRN: [id number]  Today's Date: 10/19/2023  Time Calculation  Start Time: 0440  Stop Time: 0533  Time Calculation (min): 53 min   Visit #5 on recent auth  Insurance Reviewed (per information provided by  pre-cert team)  Authorization required:  Y  Approved # of visits: 12  Authorization date range:  9/12/2023 to 11/3/2023    Key Learner:  self  Preferred Learning:  Printed Materials/Demonstration/Discussion  Learning Barriers:  none  No Fall Risk  Preferred language: english    Assessment:  Patient performs aquatic exercises with intermittent verbal and tactile cueing for avoidance of compensation and for BLE control against buoyancy. Challenged with dynamic balance progression of yoga, and multi-directional HR/TR, requiring CGA-min A and verbal cueing for ankle and hip stabilization and for utilization of balance strategies. Positive response to treatment session with moderate fatigue and decreased pain.     Plan:  Continue to tape feet when possible. Progress yoga NV per tolerance.   Continue with poc as documented in the legacy system   Current Problem  1. Labral tear of shoulder, unspecified laterality, subsequent encounter        2. Chronic left shoulder pain        3. Acute shoulder bursitis, left        4. Lumbosacral radiculopathy due to osteoarthritis of spine        5. Foot pain, bilateral              Subjective   General   Patient reports she is sore, but feels pain relief in the water. States that she has trouble sleeping due to pain.      Precautions No risk of falls     Pain   Right foot 4/10  Left knee 7/10  Right shoulder 7/10  Lumbar 7-8/10    Pain increases with walking and household chores  Pain decreases in the pool, with sitting, taping and medication    Objective     Posture  Pt is able to stand against current today while performing multi-directional DLS exercises against current     Treatments:  Aqua 53  "minutes  TM=Treadmill, T=Speed, C=Current, BTW=Back To Wall, NV=Next Visit, NP=Not Performed    **Floater**  **Does not want to go to a community pool after discharge**    TM=4 C=10 FWD 4 minutes  TM=3 C=10 Retro 3 minutes  C=10 Lateral Ambulation w/ BUE open paddle ABD/ADD x 4 each  BTW Open board push/pull, up/down x 10 each   C=10 4 Way March w/ barbell support w/ CGA x 10 each  C=10 4 Way HR/TR w/ barbell support w/ min A x 10 each  Yoga Tree <-> Warrior 2 <-> Warrior 3 <-> Sungazer <-> eyes clsoed Mountain Pose w/ CGA-min A 5 breaths x 2 each zoe    Deep End w/ float belt  Cycle 3 minutes  Scissors, Cross Country 1 minute each   Hang 3 minutes   DKTC w/ min A at pelvis  10\"/10  Stair stretches: Hamstrings/hip flexors/calves  30\"/2 zoe        "

## 2023-10-23 ENCOUNTER — HOSPITAL ENCOUNTER (OUTPATIENT)
Dept: GASTROENTEROLOGY | Facility: EXTERNAL LOCATION | Age: 53
Discharge: HOME | End: 2023-10-23
Payer: COMMERCIAL

## 2023-10-23 ENCOUNTER — TREATMENT (OUTPATIENT)
Dept: PHYSICAL THERAPY | Facility: CLINIC | Age: 53
End: 2023-10-23
Payer: COMMERCIAL

## 2023-10-23 ENCOUNTER — ANESTHESIA EVENT (OUTPATIENT)
Dept: GASTROENTEROLOGY | Facility: EXTERNAL LOCATION | Age: 53
End: 2023-10-23

## 2023-10-23 ENCOUNTER — ANESTHESIA (OUTPATIENT)
Dept: GASTROENTEROLOGY | Facility: EXTERNAL LOCATION | Age: 53
End: 2023-10-23

## 2023-10-23 VITALS
BODY MASS INDEX: 30.63 KG/M2 | DIASTOLIC BLOOD PRESSURE: 95 MMHG | WEIGHT: 156 LBS | TEMPERATURE: 97.9 F | HEART RATE: 76 BPM | SYSTOLIC BLOOD PRESSURE: 162 MMHG | OXYGEN SATURATION: 98 % | RESPIRATION RATE: 12 BRPM | HEIGHT: 60 IN

## 2023-10-23 DIAGNOSIS — G89.29 CHRONIC LEFT SHOULDER PAIN: Primary | ICD-10-CM

## 2023-10-23 DIAGNOSIS — M47.27 LUMBOSACRAL RADICULOPATHY DUE TO OSTEOARTHRITIS OF SPINE: ICD-10-CM

## 2023-10-23 DIAGNOSIS — R13.10 DYSPHAGIA, UNSPECIFIED TYPE: ICD-10-CM

## 2023-10-23 DIAGNOSIS — S43.439D: ICD-10-CM

## 2023-10-23 DIAGNOSIS — M79.671 FOOT PAIN, BILATERAL: ICD-10-CM

## 2023-10-23 DIAGNOSIS — M79.672 FOOT PAIN, BILATERAL: ICD-10-CM

## 2023-10-23 DIAGNOSIS — K21.9 GASTROESOPHAGEAL REFLUX DISEASE WITHOUT ESOPHAGITIS: ICD-10-CM

## 2023-10-23 DIAGNOSIS — Z12.11 ENCOUNTER FOR SCREENING FOR MALIGNANT NEOPLASM OF COLON: Primary | ICD-10-CM

## 2023-10-23 DIAGNOSIS — M25.512 CHRONIC LEFT SHOULDER PAIN: Primary | ICD-10-CM

## 2023-10-23 PROCEDURE — 43239 EGD BIOPSY SINGLE/MULTIPLE: CPT | Performed by: NURSE PRACTITIONER

## 2023-10-23 PROCEDURE — 88305 TISSUE EXAM BY PATHOLOGIST: CPT | Mod: TC,SUR,ELYLAB | Performed by: INTERNAL MEDICINE

## 2023-10-23 PROCEDURE — 97140 MANUAL THERAPY 1/> REGIONS: CPT | Mod: GP

## 2023-10-23 PROCEDURE — 88305 TISSUE EXAM BY PATHOLOGIST: CPT | Performed by: PATHOLOGY

## 2023-10-23 PROCEDURE — 88305 TISSUE EXAM BY PATHOLOGIST: CPT | Mod: TC,ELYLAB | Performed by: INTERNAL MEDICINE

## 2023-10-23 PROCEDURE — 97110 THERAPEUTIC EXERCISES: CPT | Mod: GP

## 2023-10-23 RX ORDER — PROPOFOL 10 MG/ML
INJECTION, EMULSION INTRAVENOUS AS NEEDED
Status: DISCONTINUED | OUTPATIENT
Start: 2023-10-23 | End: 2023-10-23

## 2023-10-23 RX ORDER — SODIUM CHLORIDE 9 MG/ML
20 INJECTION, SOLUTION INTRAVENOUS CONTINUOUS
Status: DISCONTINUED | OUTPATIENT
Start: 2023-10-23 | End: 2023-10-24 | Stop reason: HOSPADM

## 2023-10-23 RX ORDER — LEFLUNOMIDE 10 MG/1
10 TABLET ORAL DAILY
COMMUNITY
End: 2024-03-06 | Stop reason: WASHOUT

## 2023-10-23 RX ORDER — CELECOXIB 200 MG/1
200 CAPSULE ORAL 2 TIMES DAILY
COMMUNITY
End: 2023-11-27 | Stop reason: WASHOUT

## 2023-10-23 RX ORDER — LIDOCAINE HYDROCHLORIDE 20 MG/ML
INJECTION, SOLUTION INFILTRATION; PERINEURAL AS NEEDED
Status: DISCONTINUED | OUTPATIENT
Start: 2023-10-23 | End: 2023-10-23

## 2023-10-23 RX ADMIN — PROPOFOL 150 MG: 10 INJECTION, EMULSION INTRAVENOUS at 11:49

## 2023-10-23 RX ADMIN — LIDOCAINE HYDROCHLORIDE 100 MG: 20 INJECTION, SOLUTION INFILTRATION; PERINEURAL at 11:49

## 2023-10-23 RX ADMIN — SODIUM CHLORIDE: 9 INJECTION, SOLUTION INTRAVENOUS at 11:47

## 2023-10-23 SDOH — HEALTH STABILITY: MENTAL HEALTH: CURRENT SMOKER: 0

## 2023-10-23 ASSESSMENT — PAIN SCALES - GENERAL
PAINLEVEL_OUTOF10: 0 - NO PAIN
PAIN_LEVEL: 0
PAINLEVEL_OUTOF10: 0 - NO PAIN

## 2023-10-23 ASSESSMENT — COLUMBIA-SUICIDE SEVERITY RATING SCALE - C-SSRS
2. HAVE YOU ACTUALLY HAD ANY THOUGHTS OF KILLING YOURSELF?: NO
6. HAVE YOU EVER DONE ANYTHING, STARTED TO DO ANYTHING, OR PREPARED TO DO ANYTHING TO END YOUR LIFE?: NO
1. IN THE PAST MONTH, HAVE YOU WISHED YOU WERE DEAD OR WISHED YOU COULD GO TO SLEEP AND NOT WAKE UP?: NO

## 2023-10-23 ASSESSMENT — PAIN - FUNCTIONAL ASSESSMENT
PAIN_FUNCTIONAL_ASSESSMENT: 0-10
PAIN_FUNCTIONAL_ASSESSMENT: 0-10

## 2023-10-23 NOTE — PRE-SEDATION DOCUMENTATION
Patient: Ludmila Chowdary  MRN: [id number]    Pre-sedation Evaluation:  Sedation necessary for: Immobility and Analgesia  Requesting service: GI    History of Present Illness:   Early satiety   GERD     Past Medical History:   Diagnosis Date    Other specified bacterial intestinal infections 09/30/2016    Positive H. pylori test       Principle problems:  Patient Active Problem List    Diagnosis Date Noted    Acute sinusitis 10/05/2023    Allergic rhinitis 10/05/2023    Anemia 10/05/2023    Antalgic gait 10/05/2023    Arthritis of both knees 10/05/2023    Arthropathy 10/05/2023    DJD (degenerative joint disease) 10/05/2023    Back pain 10/05/2023    Benign essential hypertension 10/05/2023    Calcaneal spur 10/05/2023    Cataract, nuclear sclerotic, both eyes 10/05/2023    Cervical radiculitis 10/05/2023    Cervical spondylosis 10/05/2023    Chronic neck pain 10/05/2023    Costochondritis 10/05/2023    Diabetes mellitus, new onset (CMS/HCC) 10/05/2023    Dysuria 10/05/2023    Flat foot 10/05/2023    Foot pain, bilateral 10/05/2023    GERD (gastroesophageal reflux disease) 10/05/2023    Heel pain 10/05/2023    Helicobacter pylori gastritis 10/05/2023    Hypercholesteremia 10/05/2023    Hyperlipidemia 10/05/2023    Hypermetropia of both eyes 10/05/2023    Hypertensive retinopathy of both eyes, grade 1 10/05/2023    Insomnia 10/05/2023    Iron deficiency anemia 10/05/2023    Keratoconjunctivitis sicca of both eyes not due to Sjogren's syndrome 10/05/2023    Meibomian gland dysfunction (MGD) of upper and lower eyelid of left eye 10/05/2023    Meibomian gland dysfunction (MGD) of upper and lower eyelid of right eye 10/05/2023    Myalgia 10/05/2023    Myopia of both eyes 10/05/2023    Otalgia 10/05/2023    Plantar fasciitis, left 10/05/2023    Plantar fasciitis, right 10/05/2023    Polyarthralgia 10/05/2023    Polyuria 10/05/2023    Posterior tibial tendinitis, left 10/05/2023    Posterior tibial tendinitis, right leg  10/05/2023    Tendinitis of right rotator cuff 10/05/2023    Vitamin D deficiency 10/05/2023    Weakness 10/05/2023    Acute shoulder bursitis, left 10/05/2023    Chest pain 10/05/2023    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 adult (CMS/[facility]) 10/05/2023    CASTILLO (dyspnea on exertion) 10/05/2023    Esophageal dysphagia 10/05/2023    Inflammatory polyarthropathy or polyarthritis (CMS/[facility]) 10/05/2023    Lumbosacral radiculopathy due to osteoarthritis of spine 10/05/2023    Urine incontinence 10/05/2023    Acquired pes planus of both feet 10/05/2023    Left shoulder pain 10/04/2023    Labral tear of shoulder, unspecified laterality, subsequent encounter 10/04/2023    Bilateral knee pain 09/30/2021    Decreased range of motion (ROM) of right knee 05/30/2021    Decreased strength involving knee joint 05/30/2021    Gait difficulty 05/30/2021    Knee pain 05/30/2021    Primary osteoarthritis of left knee 05/30/2021    Chronic bilateral low back pain without sciatica 11/09/2020    Obesity, Class I, BMI 30-34.9 03/15/2019    Abnormal maternal glucose tolerance, complicating pregnancy 10/29/2003    Supervision of high-risk pregnancy 10/23/2003    Patellar tendinitis 01/10/2003     Allergies:  Allergies   Allergen Reactions    Other Shortness of breath     SWEETS & PERFUMES    Pollen Extracts Itching     Runny nose  - sneezing     PTA/Current Medications:  (Not in a hospital admission)    Current Outpatient Medications   Medication Sig Dispense Refill    amLODIPine (Norvasc) 10 mg tablet Take 1 tablet (10 mg) by mouth once daily. for blood pressure      atenolol (Tenormin) 25 mg tablet Take 1 tablet (25 mg) by mouth once daily.      atorvastatin (Lipitor) 10 mg tablet Take 1 tablet (10 mg) by mouth once daily.      betamethasone dipropionate 0.05 % cream Apply topically 2 times a day. Use 5 days on 2 days off. Not for face, armpits, or groin      celecoxib (CeleBREX) 200 mg  capsule Take 1 capsule (200 mg) by mouth 2 times a day.      fluticasone (Flonase) 50 mcg/actuation nasal spray Administer 2 sprays into each nostril once daily.      guaiFENesin (Humibid 3) 400 mg tablet       leflunomide (Arava) 10 mg tablet Take 1 tablet (10 mg) by mouth once daily.      pantoprazole (ProtoNix) 40 mg EC tablet Take 1 tablet (40 mg) by mouth once daily.      albuterol 90 mcg/actuation inhaler Inhale 2 puffs every 8 hours if needed.      aspirin 81 mg EC tablet Take 1-2 tablets ( mg) by mouth once daily.      blood sugar diagnostic (True Metrix Glucose Test Strip) strip once daily.      BUDESONIDE INHL Inhale 2 puffs 2 times a day.      cefuroxime (Ceftin) 500 mg tablet Take 1 tablet (500 mg) by mouth every 12 hours.      cetirizine (ZyrTEC) 10 mg tablet Take 1 tablet (10 mg) by mouth once daily as needed.      cholecalciferol (Vitamin D-3) 50 MCG (2000 UT) tablet Take by mouth.      CHOLECALCIFEROL, VITAMIN D3, ORAL Take 6,000 Units by mouth once daily.      diclofenac (Voltaren) 75 mg EC tablet Take 1 tablet (75 mg) by mouth 2 times a day.      diclofenac sodium 20 mg/gram /actuation(2 %) solution in metered-dose pump Apply 2 Pump topically every 12 hours if needed.      diphenhydramine-aluminum-magnesium-simethicone 1:1 mouthwash Take 5 mL by mouth 3 times a day. Swish in your mouth and spit it out. Do not swallow it      doxycycline (Vibra-Tabs) 100 mg tablet Take 1 tablet (100 mg) by mouth every 12 hours.      ergocalciferol (Vitamin D-2) 1.25 MG (87858 UT) capsule Take by mouth.      ferrous sulfate 143 mg (45 mg iron) ER tablet       hydroCHLOROthiazide (HYDRODiuril) 25 mg tablet       ketoconazole (NIZOral) 2 % shampoo       lidocaine (Lidoderm) 5 % patch Place 1 patch on the skin once daily.      Lidocaine Pain Relief 4 % patch Place on the skin once daily as needed.      Lidocaine Viscous 2 % solution RINSE MOUTH WITH 1/2 TEASPOONFUL EVERY 4 HOURS AND SPIT OUT      lisinopril 10 mg  tablet Take by mouth.      meloxicam (Mobic) 15 mg tablet Take 1 tablet (15 mg) by mouth once daily.      metFORMIN (Glucophage) 500 mg tablet Take 1 tablet (500 mg) by mouth every 12 hours. Take with food.      montelukast (Singulair) 10 mg tablet Take 1 tablet (10 mg) by mouth once daily.      omeprazole (PriLOSEC) 20 mg DR capsule Take 1 capsule (20 mg) by mouth if needed.      omeprazole (PriLOSEC) 40 mg DR capsule Take 1 capsule (40 mg) by mouth once daily.      predniSONE (Deltasone) 20 mg tablet Take 0.5 tablets (10 mg) by mouth once daily.      propylene glycoL (Systane Complete) 0.6 % drops Administer 1 drop into both eyes 3 times a day.      propylene glycoL 0.6 % drops Administer into affected eye(s).      sod sulf-pot chloride-mag sulf (Sutab) 1.479-0.188- 0.225 gram tablet Take by mouth.      tolterodine (Detrol) 2 mg tablet Take 1 tablet (2 mg) by mouth once daily at bedtime.      traZODone (Desyrel) 50 mg tablet Take 1 tablet (50 mg) by mouth once daily at bedtime.       Current Facility-Administered Medications   Medication Dose Route Frequency Provider Last Rate Last Admin    sodium chloride 0.9% infusion  20 mL/hr intravenous Continuous Mahad Pantoja MD         Past Surgical History:   has a past surgical history that includes Other surgical history (12/18/2018) and Cholecystectomy (09/30/2016).    Recent sedation/surgery (24 hours) No    Review of Systems:  Please check all that apply: No significant medical history    Pregnancy test completed prior to procedure on any menstruating female: none        NPO guidelines met: Yes    Physical Exam    Airway  Mallampati: II     Cardiovascular   Rhythm: regular  Rate: normal     Dental    Pulmonary - normal exam         Plan    ASA 2     Deep   (This is my H and P)

## 2023-10-23 NOTE — ANESTHESIA PREPROCEDURE EVALUATION
Patient: Ludmila Chowdary    Procedure Information       Anesthesia Start Date/Time: 10/23/23 1147    Scheduled providers: Mahad Pantoja MD    Procedure: EGD    Location: [facility]            Relevant Problems   Cardiovascular   (+) Benign essential hypertension   (+) Chest pain   (+) Hypercholesteremia   (+) Hyperlipidemia      Endocrine   (+) 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 adult (CMS/HCC)      GI   (+) GERD (gastroesophageal reflux disease)      Neuro/Psych   (+) Cervical radiculitis   (+) Lumbosacral radiculopathy due to osteoarthritis of spine      Pulmonary   (+) CASTILLO (dyspnea on exertion)      Hematology   (+) Anemia   (+) Iron deficiency anemia      Musculoskeletal   (+) Cervical spondylosis   (+) Chronic bilateral low back pain without sciatica   (+) Chronic neck pain   (+) DJD (degenerative joint disease)   (+) Lumbosacral radiculopathy due to osteoarthritis of spine   (+) Primary osteoarthritis of left knee      Infectious Disease   (+) Helicobacter pylori gastritis      Other   (+) Arthritis of both knees   (+) Inflammatory polyarthropathy or polyarthritis (CMS/[facility])   (+) Patellar tendinitis       Clinical information reviewed:   Tobacco  Allergies  Meds   Med Hx  Surg Hx   Fam Hx  Soc Hx        NPO Detail:  NPO/Void Status  Date of Last Liquid: 10/22/23  Time of Last Liquid: 2330  Date of Last Solid: 10/22/23  Time of Last Solid: 2100  Last Intake Type: Clear fluids         Physical Exam    Airway  Mallampati: II  TM distance: >3 FB  Neck ROM: full     Cardiovascular - normal exam     Dental - normal exam     Pulmonary - normal exam  Breath sounds clear to auscultation     Abdominal            Anesthesia Plan    ASA 2     MAC     The patient is not a current smoker.    intravenous induction   Anesthetic plan and risks discussed with patient.  Use of blood products discussed with patient who.    Plan discussed with CRNA.

## 2023-10-23 NOTE — DISCHARGE INSTRUCTIONS
Patient Instructions after a Colonoscopy      The anesthetics, sedatives or narcotics which were given to you today will be acting in your body for the next 24 hours, so you might feel a little sleepy or groggy.  This feeling should slowly wear off. Carefully read and follow the instructions.     You received sedation today:  - Do not drive or operate any machinery or power tools of any kind.   - No alcoholic beverages today, not even beer or wine.  - Do not make any important decisions or sign any legal documents.  - No over the counter medications that contain alcohol or that may cause drowsiness.      While it is common to experience mild to moderate abdominal distention, gas, or belching after your procedure, if any of these symptoms occur following discharge from the GI Lab or within one week of having your procedure, call the [facility] to be advised whether a visit to your nearest Urgent Care or Emergency Department is indicated.  Take this paper with you if you go.     - If you develop an allergic reaction to the medications that were given during your procedure such as difficulty breathing, rash, hives, severe nausea, vomiting or lightheadedness.  - If you experience chest pain, shortness of breath, severe abdominal pain, fevers and chills.  -If you develop signs and symptoms of bleeding such as blood in your spit, if your stools turn black, tarry, or bloody  - If you have not urinated within 8 hours following your procedure.  - If your IV site becomes painful, red, inflamed, or looks infected.    If you received a biopsy/polypectomy/sphincterotomy the following instructions apply below:    __ Do not use Aspirin containing products, non-steroidal medications or anti-coagulants for one week following your procedure. (Examples of these types of medications are: Advil, Arthrotec, Aleve, Coumadin, Ecotrin, Heparin, Ibuprofen, Indocin, Motrin, Naprosyn, Nuprin, Plavix, Vioxx, and Voltarin, or  their generic forms.  This list is not all-inclusive.  Check with your physician or pharmacist before resuming medications.)   __ Eat a soft diet today.  Avoid foods that are poorly digested for the next 24 hours.  These foods would include: nuts, beans, lettuce, red meats, and fried foods. Start with liquids and advance your diet as tolerated, gradually work up to eating solids.   __ Do not have a Barium Study or Enema for one week.    Your physician recommends the additional following instructions:    -You have a contact number available for emergencies. The signs and symptoms of potential delayed complications were discussed with you. You may return to normal activities tomorrow.  -Resume your previous diet.  -Continue your present medications.   -We are waiting for your pathology results.  -Your physician has recommended a repeat colonoscopy (date to be determined after pending pathology results are reviewed) for surveillance based on pathology results.  -The findings and recommendations have been discussed with you.  -The findings and recommendations were discussed with your family.  - Please see Medication Reconciliation Form for new medication/medications prescribed.

## 2023-10-23 NOTE — PROGRESS NOTES
Physical Therapy  Physical Therapy Progress Note    Patient Name Ludmila Chowdary  MRN: [id number]  Today's Date: 10/23/23  Time Calculation  Start Time: 0325  Stop Time: 0415  Time Calculation (min): 50 min    Preferred Name:  Ludmila    Insurance:    (per information provided by  pre-cert team)  Visit #: 6  Approval required:  Y  # of visits approved:  12  Approval/certification dates:  9/12/2023 to 11/3/2023    Assessment:  Patient tolerated treatment well, did well with progression this date.  Needs continued work on/skilled PT for remaining functional, objective and subjective deficits to allow them to return to prior optimal level of function with ADLs.  Patient is progressing with function/goals: not significantly  Skilled care:  there ex, manual, aquatics    Plan:    Continue with poc as documented in the legacy system.     Continue to progress per poc:   Next land visit add additional strengthening of right shoulder and progression of HEP    Therapy Diagnoses:   1. Chronic left shoulder pain        2. Labral tear of shoulder, unspecified laterality, subsequent encounter        3. Foot pain, bilateral        4. Lumbosacral radiculopathy due to osteoarthritis of spine            Subjective:   Onset Date:      Patient reports:  having endoscopy today and her throat is sore. Yesterday, she had left lumbar pain that radiated all the way down her left leg and she had difficulty sleeping.    Have you fallen  since last visit:  no    Precautions: no fall risk    Pain:  6/10 lumbar pain, achy; 6-7 right shoulder sharp pain with movement; 7 bilateral foot/arch pain, achy  Location/Type of pain:  Right shoulder, Lumbar region, bilateral arches of feet  What increases pain: Daily repetitive activities    What decreases pain:  nothing specific. Pt states that her feet feel better with taping.    HEP compliance/understanding:  partially    Objective:   Objective Measurements:    Function:  Pt's function at home is the same  "as far as she prepares 2 meals per day and has pain from doing that along with washing many dishes and cleaning her house  Strength: Pt is challenged to raise arches without curling toes under. Pt also increased resistance of seated HS curls by 10#  Pt also gaining scapular strength as she is able to increase resistance to blue from green for rowing.  Treatment:   **= HEP  NV= Next visit  np= not performed  nb= non-billable  G= group HEP= discharged to [facility]     Therapeutic Exercise:  Minutes: 30  Nu Step L2,10 minutes Subjective taken  Standing HS and hip flexor stretch on steps, 30\" x 2 R/L ea NP  Shoulder pulleys, flexion 2 min  Mid Rows, high zoe. Shoulder ext, blue, 2x10 each   Standing heel raises, 20x  Seated HS curls, pl 2, seat 3, 20x each  Seated LAQ, pl 1, seat 3, 20x each  Seated zoe arch raises.     Manual:    Minutes: 15  Manual:  DTM to zoe arches and up into the PTT bilaterally. Applied arch tape to bilateral feet.  Education: Educated patient in the idea that if the tape helps her feet that much, then she made need to talk to person who made her current pair of orthotics for possible modifications or new castings.      "

## 2023-10-23 NOTE — ANESTHESIA POSTPROCEDURE EVALUATION
Patient: Ludmila Chowdary    Procedure Summary       Date: 10/23/23 Room / Location: [facility]    Anesthesia Start: 1147 Anesthesia Stop:     Procedure: EGD Diagnosis:       Gastroesophageal reflux disease without esophagitis      Dysphagia, unspecified type      Encounter for screening for malignant neoplasm of colon    Scheduled Providers: Mahad Pantoja MD Responsible Provider: MARIO Anaya    Anesthesia Type: MAC ASA Status: 2            Anesthesia Type: MAC    Vitals Value Taken Time   /66 10/23/23 1159   Temp 36.6 10/23/23 1159   Pulse 88 10/23/23 1159   Resp 13 10/23/23 1159   SpO2 100 10/23/23 1159       Anesthesia Post Evaluation    Patient location during evaluation: bedside  Patient participation: complete - patient cannot participate  Level of consciousness: responsive to verbal stimuli  Pain score: 0  Pain management: adequate  Airway patency: patent  Cardiovascular status: acceptable and hemodynamically stable  Respiratory status: acceptable  Hydration status: acceptable        No notable events documented.

## 2023-10-25 ENCOUNTER — TREATMENT (OUTPATIENT)
Dept: PHYSICAL THERAPY | Facility: CLINIC | Age: 53
End: 2023-10-25
Payer: COMMERCIAL

## 2023-10-25 ENCOUNTER — APPOINTMENT (OUTPATIENT)
Dept: GASTROENTEROLOGY | Facility: EXTERNAL LOCATION | Age: 53
End: 2023-10-25
Payer: COMMERCIAL

## 2023-10-25 DIAGNOSIS — M25.512 CHRONIC LEFT SHOULDER PAIN: Primary | ICD-10-CM

## 2023-10-25 DIAGNOSIS — M47.27 LUMBOSACRAL RADICULOPATHY DUE TO OSTEOARTHRITIS OF SPINE: ICD-10-CM

## 2023-10-25 DIAGNOSIS — G89.29 CHRONIC LEFT SHOULDER PAIN: Primary | ICD-10-CM

## 2023-10-25 DIAGNOSIS — M79.672 FOOT PAIN, BILATERAL: ICD-10-CM

## 2023-10-25 DIAGNOSIS — S43.439D: ICD-10-CM

## 2023-10-25 DIAGNOSIS — M79.671 FOOT PAIN, BILATERAL: ICD-10-CM

## 2023-10-25 PROCEDURE — 97110 THERAPEUTIC EXERCISES: CPT | Mod: GP

## 2023-10-25 PROCEDURE — 97140 MANUAL THERAPY 1/> REGIONS: CPT | Mod: GP

## 2023-10-25 NOTE — PROGRESS NOTES
Physical Therapy  Physical Therapy Progress Note    Patient Name Ludmila Chowdary  MRN: [id number]  Today's Date: 10/25/23  Time Calculation  Start Time: 0247  Stop Time: 0325  Time Calculation (min): 38 min    Preferred Name:  Ludmila    Insurance:    (per information provided by  pre-cert team)  Visit #: 7 from latest approval  Approval required:  Y  # of visits approved:  12  Approval/certification dates:  9/12/2023 to 11/3/2023    Script:  Evaluate and treat    Assessment:  Patient tolerated treatment well, did well with progression this date.  Needs continued work on/skilled PT for remaining functional, objective and subjective deficits to allow them to perform daily tasks while maintaining adequate posture and controlling pain  Patient is progressing with function/goals: no  Skilled care:  there ex, aquatics, manual treatment    Plan:    Continue with poc as documented in the legacy system.     Continue to progress per poc:   Next land visit, take objective measurements    Therapy Diagnoses:   1. Chronic left shoulder pain        2. Labral tear of shoulder, unspecified laterality, subsequent encounter        3. Foot pain, bilateral        4. Lumbosacral radiculopathy due to osteoarthritis of spine            Subjective:   Onset Date:  5/1/2023    Patient reports:  right shoulder pain is a little less today.    Have you fallen  since last visit:  no    Precautions: no fall risk      Pain:  6/10  Location/Type of pain:  Juan M shoulders, juan m feet,   What increases pain: daily house tasks    What decreases pain:  rest    HEP compliance/understanding:  partially    Objective:   Objective Measurements:    AAROM with pulleys for right shoulder is greater than 130  Pt able to increase resistance for scapular stability ex maintain fair/good posture    Treatment:   **= HEP  NV= Next visit  np= not performed  nb= non-billable  G= group HEP= discharged to [facility]     Therapeutic Exercise:  Minutes: 30  Nu Step L3,8 minutes  "Subjective taken  Shoulder pulleys, flexion 2 min  Mid Rows, high zoe. Shoulder ext, blue, 2x10 each   Red scap stabilization 20  Standing heel raises, 20x  Seated HS curls, pl 2, seat 3, 20x each  Seated LAQ, pl 1, seat 3, 20x each  Standing zoe HR/TR, 20x on Airex  Ball rolls to massage zoe feet 2' each foot  Standing HS and hip flexor stretch on steps, 30\" x 2 R/L ea NP  Manual:    Minutes: 8  Manual:  Applied arch tape to bilateral feet.  Education: her current pair of orthotics for possible modifications or new castings.        "

## 2023-10-26 ENCOUNTER — TREATMENT (OUTPATIENT)
Dept: PHYSICAL THERAPY | Facility: CLINIC | Age: 53
End: 2023-10-26
Payer: COMMERCIAL

## 2023-10-26 DIAGNOSIS — G89.29 CHRONIC LEFT SHOULDER PAIN: Primary | ICD-10-CM

## 2023-10-26 DIAGNOSIS — M25.512 CHRONIC LEFT SHOULDER PAIN: Primary | ICD-10-CM

## 2023-10-26 DIAGNOSIS — M79.672 FOOT PAIN, BILATERAL: ICD-10-CM

## 2023-10-26 DIAGNOSIS — S43.439D: ICD-10-CM

## 2023-10-26 DIAGNOSIS — M47.27 LUMBOSACRAL RADICULOPATHY DUE TO OSTEOARTHRITIS OF SPINE: ICD-10-CM

## 2023-10-26 DIAGNOSIS — M79.671 FOOT PAIN, BILATERAL: ICD-10-CM

## 2023-10-26 PROCEDURE — 97113 AQUATIC THERAPY/EXERCISES: CPT | Mod: GP,CQ

## 2023-10-26 NOTE — PROGRESS NOTES
Physical Therapy  Physical Therapy Progress Note    Patient Name Ludmila Chowdary   MRN: [id number]  Today's Date: 10/26/23  Time Calculation  Start Time: 0230  Stop Time: 0320  Time Calculation (min): 50 min    Insurance:    (per information provided by  pre-cert team)  Visit #: 8 from latest approval  Approval required:  Y  # of visits approved:  12  Approval/certification dates:  9/12/2023 to 11/3/2023    Therapy Diagnoses:   1. Chronic left shoulder pain        2. Labral tear of shoulder, unspecified laterality, subsequent encounter        3. Foot pain, bilateral        4. Lumbosacral radiculopathy due to osteoarthritis of spine            General:  Reason for visit: gait training, strengthening, flexibility, balance   Next MD appt:  Not sure  Preferred Name:  Ludmila  Script:  Evaluate and treat  Onset Date:  Onset Date:  5/1/2023       Assessment:  Patient tolerated treatment well, did well with progression this date.  Needs continued work on/skilled PT for improved balance, gait, BLE control against buoyancy and trunk stabilization for remaining functional, objective and subjective deficits to allow them to return to prior /optimal level of function with ADLs and for community pool HEP planning.   Patient is progressing with function/goals with improved static and dynamic balance and trunk stabilization in aquatic environment  Skilled care:  aquatic exercise progression, gait training, education    Plan:    Continue with poc as documented in the legacy system.     Continue to progress per poc:   NV continue to progress  Chi per tolerance.     Subjective:   Patient reports: cooking was easier today. Feels she is getting stronger.     Have you fallen since last visit:  No    Precautions: No fall risk      Pain:  5/10  Location/Type of pain:  bilateral feet, bilateral shoulders    HEP compliance/understanding:  Yes, her shoulders are feeling gabriel since starting her HEP.     Objective:   Objective  "Measurements:    Function:     Posture: Forward head, rounded shoulders, thoracic kyphosis  Gait:Ambulates on pool deck without assistive device with step through gait, lacking reciprocal arm swing, with decreased bilateral heel strike and toe off  Ascends/descends stairs with step to gait with bilateral hand rails with independence  Balance: SLS EO RLE 19 seconds EO LLE 10 seconds EC RLE 5 seconds EC LLE 3 seconds    Treatment:   **= HEP  NV= Next visit  np= not performed  nb= non-billable  G= group HEP= discharged to HEP      Aquatics:          45 minutes  TM=Treadmill, T=Speed, C=Current, BTW=Back To Wall, NV=Next Visit, NP=Not Performed     **Floater**  **Does not want to go to a community pool after discharge**     TM=4 C=10 FWD 4 minutes  TM=3 C=10 Retro 3 minutes  C=10 Lateral Ambulation w/ BUE open paddle ABD/ADD x 4 each  BTW Open board push/pull, up/down x 10 each   C=10 4 Way March w/ barbell support w/ CGA x 10 each  C=10 4 Way HR/TR w/ barbell support w/ min A x 10 each  Yoga Tree <-> Warrior 2 <-> Warrior 3 <-> Sungazer <-> eyes clsoed Mountain Pose w/ CGA-min A 5 breaths x 2 each zoe     Deep End w/ float belt  Cycle 3 minutes  Scissors, Cross Country 1 minute each NB  Hang 3 minutes NB  DKTC w/ min A at pelvis  10\"/10 NV  Stair stretches: Hamstrings/hip flexors/calves  30\"/2 zoe             Education:  trunk stabilization, postural awareness, control against buoyancy   HEP Progression:  building aquatic HEP    "

## 2023-10-30 ENCOUNTER — TREATMENT (OUTPATIENT)
Dept: PHYSICAL THERAPY | Facility: CLINIC | Age: 53
End: 2023-10-30
Payer: COMMERCIAL

## 2023-10-30 DIAGNOSIS — M25.512 CHRONIC LEFT SHOULDER PAIN: Primary | ICD-10-CM

## 2023-10-30 DIAGNOSIS — S43.439D: ICD-10-CM

## 2023-10-30 DIAGNOSIS — M79.672 FOOT PAIN, BILATERAL: ICD-10-CM

## 2023-10-30 DIAGNOSIS — M47.27 LUMBOSACRAL RADICULOPATHY DUE TO OSTEOARTHRITIS OF SPINE: ICD-10-CM

## 2023-10-30 DIAGNOSIS — M79.671 FOOT PAIN, BILATERAL: ICD-10-CM

## 2023-10-30 DIAGNOSIS — G89.29 CHRONIC LEFT SHOULDER PAIN: Primary | ICD-10-CM

## 2023-10-30 PROCEDURE — 97113 AQUATIC THERAPY/EXERCISES: CPT | Mod: GP,CQ

## 2023-10-30 NOTE — PROGRESS NOTES
Physical Therapy  Physical Therapy Progress Note    Patient Name Ludmila Chowdary   MRN: [id number]  Today's Date: 10/30/23  Time Calculation  Start Time: 1430  Stop Time: 1523  Time Calculation (min): 53 min    Insurance:    (per information provided by  pre-cert team)  Visit #: 9 from latest approval  Approval required:  Y  # of visits approved:  12  Approval/certification dates:  9/12/2023 to 11/3/2023    Therapy Diagnoses:   1. Chronic left shoulder pain        2. Labral tear of shoulder, unspecified laterality, subsequent encounter        3. Foot pain, bilateral        4. Lumbosacral radiculopathy due to osteoarthritis of spine              General:  Reason for visit: gait training, strengthening, flexibility, balance   Next MD appt:  Not sure  Preferred Name:  Ludmila  Script:  Evaluate and treat  Onset Date:  5/1/2023       Assessment:  Patient tolerated treatment progression of added  Chi and increased current well, requiring verbal cueing for postural awareness and for BLE control against buoyancy..  Needs continued work on/skilled PT for improved balance, gait, BLE control against buoyancy, postural awareness and trunk stabilization for remaining functional, objective and subjective deficits to allow them to return to prior /optimal level of function with ADLs and for community pool HEP planning.   Patient is progressing with function/goals with improved static and dynamic balance and trunk stabilization in aquatic environment  Skilled care:  aquatic exercise progression, gait training, education    Plan:    Continue with poc as documented in the legacy system.     Continue to progress per poc:   NV continue to progress yoga per tolerance. Monitor response to  Chi progression and increased current.     Subjective:   Patient reports: she hosted family friends, threw a party and felt good while doing all of the extra activties.     Have you fallen since last visit:  No    Precautions: No fall  "risk      Pain:  5/10  Location/Type of pain:  bilateral feet, bilateral shoulders    HEP compliance/understanding:  Yes, her shoulders are feeling gabriel since starting her HEP.     Objective:   Objective Measurements:    Posture: Forward head, rounded shoulders, thoracic kyphosis  Gait:Ambulates on pool deck without assistive device with step through gait, lacking reciprocal arm swing, with decreased bilateral heel strike and toe off  Ascends/descends stairs with step to gait with bilateral hand rails with independence  Balance: SLS EO RLE 20 seconds EO LLE 10 seconds EC RLE 7 seconds EC LLE 3 seconds    Treatment:   **= HEP  NV= Next visit  np= not performed  nb= non-billable  G= group HEP= discharged to [facility]      Aquatics:          53 minutes  TM=Treadmill, T=Speed, C=Current, BTW=Back To Wall, NV=Next Visit, NP=Not Performed    **Floater**  **Does not want to go to a community pool after discharge**    TM=4 C=15 FWD 4 minutes  TM=3 C=15 Retro 3 minutes  C=15 Lateral Ambulation w/ BUE open paddle ABD/ADD x 4 each  Plank w/ BLE hip ABD x 10  BTW Open board push/pull, up/down x 10 each   C=15 4 Way March in TM bars x 10 each  C=15 4 Way HR/TR in TM bars x 10 each  C=15 3 Way hip AROM against C x 10 each zoe  Yoga Tree <-> [place] 2 <-> Warrior 3 <-> Sungazer <-> eyes clsoed Mountain Pose w/ CGA-min A 5 breaths x 2 each bi    Chi #1-#4 5\"/5 each    Deep End w/ float belt  Cycle 3 minutes  Scissors, Cross Country 1 minute each   Hang 3 minutes   DKTC  10\"/10     Stair stretches: Hamstrings/hip flexors/calves  30\"/2 zoe            Education: postural awareness, BLE control against buoyancy, utilization of balance strategies, aquatic exercise progression  HEP Progression:  N/A does not intend to go to a community pool  "

## 2023-11-02 ENCOUNTER — APPOINTMENT (OUTPATIENT)
Dept: PHYSICAL THERAPY | Facility: CLINIC | Age: 53
End: 2023-11-02
Payer: COMMERCIAL

## 2023-11-03 LAB
LABORATORY COMMENT REPORT: NORMAL
PATH REPORT.FINAL DX SPEC: NORMAL
PATH REPORT.GROSS SPEC: NORMAL
PATH REPORT.TOTAL CANCER: NORMAL

## 2023-11-13 ENCOUNTER — TELEPHONE (OUTPATIENT)
Dept: GASTROENTEROLOGY | Facility: CLINIC | Age: 53
End: 2023-11-13
Payer: COMMERCIAL

## 2023-11-15 NOTE — TELEPHONE ENCOUNTER
Please let her know that there is bacterial infection in the stomach, which will require treatment.  They should see jignesh calvillo in follow up to discuss treatment

## 2023-11-15 NOTE — RESULT ENCOUNTER NOTE
During recent upper endoscopy biopsies were taken from the stomach.  Pathology results show presence of an infection by bacteria called H. pylori.  This bacteria can be treated with a course of 2 weeks of antibiotics.  Please follow up with Malorie Alfaro CNP in the GI office to discuss the treatment.  You can call [phone] to make an appointment.  Do not hesitate to contact me if you have any questions or concerns.  Sincerely,

## 2023-11-16 DIAGNOSIS — A04.8 H. PYLORI INFECTION: Primary | ICD-10-CM

## 2023-11-16 RX ORDER — OMEPRAZOLE 40 MG/1
40 CAPSULE, DELAYED RELEASE ORAL
Qty: 14 CAPSULE | Refills: 0 | Status: SHIPPED | OUTPATIENT
Start: 2023-11-16 | End: 2024-05-01 | Stop reason: ALTCHOICE

## 2023-11-16 RX ORDER — TETRACYCLINE HYDROCHLORIDE 500 MG/1
500 CAPSULE ORAL 4 TIMES DAILY
Qty: 56 CAPSULE | Refills: 0 | Status: SHIPPED | OUTPATIENT
Start: 2023-11-16 | End: 2023-11-30

## 2023-11-16 RX ORDER — METRONIDAZOLE 500 MG/1
250 TABLET ORAL 4 TIMES DAILY
Qty: 28 TABLET | Refills: 0 | Status: SHIPPED | OUTPATIENT
Start: 2023-11-16 | End: 2023-11-29 | Stop reason: WASHOUT

## 2023-11-16 NOTE — PROGRESS NOTES
Called with results of H pylori infection  Discussed medications, treatment, SE and follow up  Verbalized understanding  Will follow up in 2-3 weeks for breath test for eradication

## 2023-11-20 ENCOUNTER — OFFICE VISIT (OUTPATIENT)
Dept: OPHTHALMOLOGY | Facility: CLINIC | Age: 53
End: 2023-11-20
Payer: COMMERCIAL

## 2023-11-20 ENCOUNTER — TELEPHONE (OUTPATIENT)
Dept: PRIMARY CARE | Facility: CLINIC | Age: 53
End: 2023-11-20

## 2023-11-20 DIAGNOSIS — H25.13 NUCLEAR SCLEROTIC CATARACT OF BOTH EYES: ICD-10-CM

## 2023-11-20 DIAGNOSIS — H02.883 MEIBOMIAN GLAND DISEASE OF RIGHT EYE: ICD-10-CM

## 2023-11-20 DIAGNOSIS — H02.886 MEIBOMIAN GLAND DISEASE OF LEFT EYE: ICD-10-CM

## 2023-11-20 DIAGNOSIS — H16.223 KERATOCONJUNCTIVITIS SICCA OF BOTH EYES NOT SPECIFIED AS SJOGREN'S: Primary | ICD-10-CM

## 2023-11-20 DIAGNOSIS — H52.03 HYPERMETROPIA OF BOTH EYES: ICD-10-CM

## 2023-11-20 DIAGNOSIS — H52.4 PRESBYOPIA: ICD-10-CM

## 2023-11-20 PROCEDURE — 92015 DETERMINE REFRACTIVE STATE: CPT | Performed by: OPTOMETRIST

## 2023-11-20 PROCEDURE — 83516 IMMUNOASSAY NONANTIBODY: CPT | Performed by: OPTOMETRIST

## 2023-11-20 PROCEDURE — 99214 OFFICE O/P EST MOD 30 MIN: CPT | Performed by: OPTOMETRIST

## 2023-11-20 RX ORDER — POLYETHYLENE GLYCOL 400 AND PROPYLENE GLYCOL 4; 3 MG/ML; MG/ML
1 SOLUTION/ DROPS OPHTHALMIC 3 TIMES DAILY
Qty: 10 ML | Refills: 4 | Status: SHIPPED | OUTPATIENT
Start: 2023-11-20 | End: 2024-11-19

## 2023-11-20 ASSESSMENT — REFRACTION_MANIFEST
OD_ADD: +1.75
OS_CYLINDER: SPHERE
OS_SPHERE: +0.75
OS_ADD: +1.75
OD_CYLINDER: SPHERE
OD_SPHERE: +0.25

## 2023-11-20 ASSESSMENT — ENCOUNTER SYMPTOMS
NEUROLOGICAL NEGATIVE: 0
PSYCHIATRIC NEGATIVE: 0
ENDOCRINE NEGATIVE: 0
EYES NEGATIVE: 0
CARDIOVASCULAR NEGATIVE: 0
RESPIRATORY NEGATIVE: 0
ALLERGIC/IMMUNOLOGIC NEGATIVE: 0
GASTROINTESTINAL NEGATIVE: 0
MUSCULOSKELETAL NEGATIVE: 0
HEMATOLOGIC/LYMPHATIC NEGATIVE: 0
CONSTITUTIONAL NEGATIVE: 0

## 2023-11-20 ASSESSMENT — CONF VISUAL FIELD
OD_SUPERIOR_TEMPORAL_RESTRICTION: 0
OS_SUPERIOR_TEMPORAL_RESTRICTION: 0
OS_SUPERIOR_NASAL_RESTRICTION: 0
OD_SUPERIOR_NASAL_RESTRICTION: 0
OD_INFERIOR_TEMPORAL_RESTRICTION: 0
OS_INFERIOR_NASAL_RESTRICTION: 0
OS_NORMAL: 1
OD_NORMAL: 1
OD_INFERIOR_NASAL_RESTRICTION: 0
METHOD: COUNTING FINGERS
OS_INFERIOR_TEMPORAL_RESTRICTION: 0

## 2023-11-20 ASSESSMENT — VISUAL ACUITY
METHOD: SNELLEN - LINEAR
OD_SC: 20/25
OS_SC: 20/25

## 2023-11-20 ASSESSMENT — TEAR BREAK UP TIME (TBUT)
OS_TBUT: 5
OD_TBUT: 6

## 2023-11-20 ASSESSMENT — TONOMETRY
OD_IOP_MMHG: 14
IOP_METHOD: TONOPEN
OS_IOP_MMHG: 13

## 2023-11-20 ASSESSMENT — SLIT LAMP EXAM - LIDS
COMMENTS: NORMAL
COMMENTS: NORMAL

## 2023-11-20 ASSESSMENT — EXTERNAL EXAM - RIGHT EYE: OD_EXAM: NORMAL

## 2023-11-20 ASSESSMENT — CUP TO DISC RATIO
OD_RATIO: 0.3
OS_RATIO: 0.3

## 2023-11-20 ASSESSMENT — EXTERNAL EXAM - LEFT EYE: OS_EXAM: NORMAL

## 2023-11-20 NOTE — PROGRESS NOTES
Assessment/Plan   Diagnoses and all orders for this visit:  Keratoconjunctivitis sicca of both eyes not specified as Sjogren's  Meibomian gland disease of right eye  Meibomian gland disease of left eye  -     peg 400-propylene glycol (Systane Ultra) 0.4-0.3 % drops ophthalmic drops; Administer 1 drop into both eyes 3 times a day.  Pt educated on exam findings. Start level 1 tx of ATs qid OU, gel/estrada lubricants qhs OU, warm compresses for 8-10min. Discussed that dry eye is a chronic, multi-factorial problem that does require chronic, consistent treatment. RTC prn for follow up with Tear Osmolarity (or Inflammadry), Schirmers B test, OSDI.    Nuclear sclerotic cataract of both eyes  Patient's cataracts are not visually significant. Will monitor for changes. No indication for surgery at this time.    Hypermetropia of both eyes  Presbyopia  New spec rx released today per patient request. Ocular health wnl for age OU. Monitor 1 year or sooner prn. Refraction billed today.

## 2023-11-20 NOTE — TELEPHONE ENCOUNTER
----- Message from Nola Guerra MD sent at 11/15/2023  2:38 PM EST -----  Please call patient to inform the result and gi instruction  ----- Message -----  From: Mahad Pantoja MD  Sent: 11/15/2023  11:33 AM EST  To: TRINH Degroot-KEON; Nola Guerra MD    During recent upper endoscopy biopsies were taken from the stomach.  Pathology results show presence of an infection by bacteria called H. pylori.  This bacteria can be treated with a course of 2 weeks of antibiotics.  Please follow up with Malorie Alfaro CNP in the GI office to discuss the treatment.  You can call [phone] to make an appointment.  Do not hesitate to contact me if you have any questions or concerns.  Sincerely,

## 2023-11-27 ENCOUNTER — OFFICE VISIT (OUTPATIENT)
Dept: PRIMARY CARE | Facility: CLINIC | Age: 53
End: 2023-11-27
Payer: COMMERCIAL

## 2023-11-27 VITALS
WEIGHT: 164.2 LBS | SYSTOLIC BLOOD PRESSURE: 117 MMHG | HEART RATE: 105 BPM | DIASTOLIC BLOOD PRESSURE: 79 MMHG | BODY MASS INDEX: 32.24 KG/M2 | TEMPERATURE: 98.9 F | HEIGHT: 60 IN

## 2023-11-27 DIAGNOSIS — B96.81 HELICOBACTER PYLORI GASTRITIS: ICD-10-CM

## 2023-11-27 DIAGNOSIS — K21.9 GASTROESOPHAGEAL REFLUX DISEASE WITHOUT ESOPHAGITIS: Primary | ICD-10-CM

## 2023-11-27 DIAGNOSIS — K29.70 HELICOBACTER PYLORI GASTRITIS: ICD-10-CM

## 2023-11-27 DIAGNOSIS — M54.9 ACUTE BACK PAIN, UNSPECIFIED BACK LOCATION, UNSPECIFIED BACK PAIN LATERALITY: ICD-10-CM

## 2023-11-27 DIAGNOSIS — I10 BENIGN ESSENTIAL HYPERTENSION: ICD-10-CM

## 2023-11-27 DIAGNOSIS — R39.89 SUSPECTED URINARY TRACT INFECTION: ICD-10-CM

## 2023-11-27 LAB
POC APPEARANCE, URINE: ABNORMAL
POC BILIRUBIN, URINE: NEGATIVE
POC BLOOD, URINE: NEGATIVE
POC COLOR, URINE: ABNORMAL
POC GLUCOSE, URINE: NEGATIVE MG/DL
POC KETONES, URINE: NEGATIVE MG/DL
POC LEUKOCYTES, URINE: NEGATIVE
POC NITRITE,URINE: NEGATIVE
POC PH, URINE: 6.5 PH
POC PROTEIN, URINE: NEGATIVE MG/DL
POC SPECIFIC GRAVITY, URINE: 1.01
POC UROBILINOGEN, URINE: 0.2 EU/DL

## 2023-11-27 PROCEDURE — 4010F ACE/ARB THERAPY RXD/TAKEN: CPT | Performed by: INTERNAL MEDICINE

## 2023-11-27 PROCEDURE — 87086 URINE CULTURE/COLONY COUNT: CPT

## 2023-11-27 PROCEDURE — 3074F SYST BP LT 130 MM HG: CPT | Performed by: INTERNAL MEDICINE

## 2023-11-27 PROCEDURE — 99214 OFFICE O/P EST MOD 30 MIN: CPT | Performed by: INTERNAL MEDICINE

## 2023-11-27 PROCEDURE — 1036F TOBACCO NON-USER: CPT | Performed by: INTERNAL MEDICINE

## 2023-11-27 PROCEDURE — 3044F HG A1C LEVEL LT 7.0%: CPT | Performed by: INTERNAL MEDICINE

## 2023-11-27 PROCEDURE — 3078F DIAST BP <80 MM HG: CPT | Performed by: INTERNAL MEDICINE

## 2023-11-27 PROCEDURE — 81002 URINALYSIS NONAUTO W/O SCOPE: CPT | Performed by: INTERNAL MEDICINE

## 2023-11-27 RX ORDER — CELECOXIB 200 MG/1
200 CAPSULE ORAL DAILY
Qty: 30 CAPSULE | Refills: 0 | Status: SHIPPED | OUTPATIENT
Start: 2023-11-27 | End: 2023-12-27

## 2023-11-27 ASSESSMENT — PATIENT HEALTH QUESTIONNAIRE - PHQ9
2. FEELING DOWN, DEPRESSED OR HOPELESS: NOT AT ALL
SUM OF ALL RESPONSES TO PHQ9 QUESTIONS 1 AND 2: 0
1. LITTLE INTEREST OR PLEASURE IN DOING THINGS: NOT AT ALL

## 2023-11-27 NOTE — PROGRESS NOTES
Assessment/Plan   Problem List Items Addressed This Visit       Back pain    Relevant Medications    celecoxib (CeleBREX) 200 mg capsule    Benign essential hypertension    GERD (gastroesophageal reflux disease) - Primary    Helicobacter pylori gastritis    Suspected urinary tract infection    Relevant Orders    Urine Culture    POCT UA (nonautomated) manually resulted (Completed)   Follow-up as needed  We will see the response to the treatment and depending upon the finding of urine culture further treatment will be advised    Subjective   Patient ID: Ludmila Chowdayr is a 52 y.o. female who presents for Back Pain and Foot Pain (Heels of feet are hurting.  Knee pain as well.  ).    Past Surgical History:   Procedure Laterality Date    CHOLECYSTECTOMY  09/30/2016    Cholecystectomy    OTHER SURGICAL HISTORY  12/18/2018    Gallbladder surgery      Family History   Problem Relation Name Age of Onset    Arthritis Mother      Hypertension Mother      Hypertension Father        Social History     Socioeconomic History    Marital status:      Spouse name: Not on file    Number of children: Not on file    Years of education: Not on file    Highest education level: Not on file   Occupational History    Not on file   Tobacco Use    Smoking status: Never    Smokeless tobacco: Never   Vaping Use    Vaping Use: Never used   Substance and Sexual Activity    Alcohol use: Never    Drug use: Never    Sexual activity: Not on file   Other Topics Concern    Not on file   Social History Narrative    Not on file     Social Determinants of Health     Financial Resource Strain: Not on file   Food Insecurity: Not on file   Transportation Needs: Not on file   Physical Activity: Not on file   Stress: Not on file   Social Connections: Not on file   Intimate Partner Violence: Not on file   Housing Stability: Not on file      Other and Pollen extracts   Current Outpatient Medications   Medication Sig Dispense Refill    albuterol 90  mcg/actuation inhaler Inhale 2 puffs every 8 hours if needed.      amLODIPine (Norvasc) 10 mg tablet Take 1 tablet (10 mg) by mouth once daily. for blood pressure      atenolol (Tenormin) 25 mg tablet Take 1 tablet (25 mg) by mouth once daily.      atorvastatin (Lipitor) 10 mg tablet Take 1 tablet (10 mg) by mouth once daily.      betamethasone dipropionate 0.05 % cream Apply topically 2 times a day. Use 5 days on 2 days off. Not for face, armpits, or groin      bismuth subsalicylate (Pink Bismuth) 262 mg tablet Take 2 tablets (524 mg) by mouth once daily. 28 tablet 0    blood sugar diagnostic (True Metrix Glucose Test Strip) strip once daily.      BUDESONIDE INHL Inhale 2 puffs 2 times a day.      ergocalciferol (Vitamin D-2) 1.25 MG (70447 UT) capsule Take by mouth.      fluticasone (Flonase) 50 mcg/actuation nasal spray Administer 2 sprays into each nostril once daily.      ketoconazole (NIZOral) 2 % shampoo       leflunomide (Arava) 10 mg tablet Take 1 tablet (10 mg) by mouth once daily.      lidocaine (Lidoderm) 5 % patch Place 1 patch on the skin once daily.      omeprazole (PriLOSEC) 40 mg DR capsule Take 1 capsule (40 mg) by mouth once daily in the morning. Take before meals for 14 days. Do not crush or chew. 14 capsule 0    pantoprazole (ProtoNix) 40 mg EC tablet Take 1 tablet (40 mg) by mouth once daily.      peg 400-propylene glycol (Systane Ultra) 0.4-0.3 % drops ophthalmic drops Administer 1 drop into both eyes 3 times a day. 10 mL 4    propylene glycoL (Systane Complete) 0.6 % drops Administer 1 drop into both eyes 3 times a day.      tetracycline 500 mg capsule Take 1 capsule (500 mg) by mouth 4 times a day for 14 days. 56 capsule 0    celecoxib (CeleBREX) 200 mg capsule Take 1 capsule (200 mg) by mouth once daily. 30 capsule 0    ferrous sulfate 143 mg (45 mg iron) ER tablet       guaiFENesin (Humibid 3) 400 mg tablet       hydroCHLOROthiazide (HYDRODiuril) 25 mg tablet       Lidocaine Pain Relief 4  % patch Place on the skin once daily as needed.      Lidocaine Viscous 2 % solution RINSE MOUTH WITH 1/2 TEASPOONFUL EVERY 4 HOURS AND SPIT OUT      lisinopril 10 mg tablet Take by mouth.      metFORMIN (Glucophage) 500 mg tablet Take 1 tablet (500 mg) by mouth every 12 hours. Take with food.      metroNIDAZOLE (Flagyl) 500 mg tablet Take 0.5 tablets (250 mg) by mouth 4 times a day for 14 days. (Patient not taking: Reported on 11/27/2023) 28 tablet 0    montelukast (Singulair) 10 mg tablet Take 1 tablet (10 mg) by mouth once daily.      predniSONE (Deltasone) 20 mg tablet Take 0.5 tablets (10 mg) by mouth once daily.      propylene glycoL 0.6 % drops Administer into affected eye(s).      sod sulf-pot chloride-mag sulf (Sutab) 1.479-0.188- 0.225 gram tablet Take by mouth.      tolterodine (Detrol) 2 mg tablet Take 1 tablet (2 mg) by mouth once daily at bedtime.      traZODone (Desyrel) 50 mg tablet Take 1 tablet (50 mg) by mouth once daily at bedtime.       No current facility-administered medications for this visit.      Vitals:    11/27/23 1201   BP: 117/79   BP Location: Right arm   Patient Position: Sitting   Pulse: 105   Temp: 37.2 °C (98.9 °F)   Weight: 74.5 kg (164 lb 3.2 oz)   Height: 1.524 m (5')      Problem List Items Addressed This Visit       Back pain    Relevant Medications    celecoxib (CeleBREX) 200 mg capsule    Benign essential hypertension    GERD (gastroesophageal reflux disease) - Primary    Helicobacter pylori gastritis    Suspected urinary tract infection    Relevant Orders    Urine Culture    POCT UA (nonautomated) manually resulted (Completed)      Orders Placed This Encounter   Procedures    Urine Culture     Order Specific Question:   Release result to MyChart     Answer:   Immediate [1]    POCT UA (nonautomated) manually resulted     Order Specific Question:   Release result to MyChart     Answer:   Immediate [1]        HPI  Presented today because she feels that the pain is in the right  "side back feet and trunk here and there not any classical symptoms of vomiting or the other  She has been taking meloxicam in the past but has not been taking lately and she has multiple DJD as well and she was wondering whether she should have an MRI of the spine  She also had some urinary symptoms and concerned about urine culture and urine infection    ROS otherwise negative    PHYSICAL EXAM  Normal      No results found for: \"PR1\", \"BMPR1A\", \"CMPLAS\", \"LV5SVZWG\", \"KPSAT\"   Lab Results   Component Value Date    CHOL 158 08/16/2023    CHHDL 3.6 08/16/2023                  "

## 2023-11-29 ENCOUNTER — TELEMEDICINE (OUTPATIENT)
Dept: GASTROENTEROLOGY | Facility: CLINIC | Age: 53
End: 2023-11-29
Payer: COMMERCIAL

## 2023-11-29 DIAGNOSIS — A04.8 H. PYLORI INFECTION: ICD-10-CM

## 2023-11-29 DIAGNOSIS — R19.8 ALTERNATING CONSTIPATION AND DIARRHEA: ICD-10-CM

## 2023-11-29 DIAGNOSIS — K21.9 GASTROESOPHAGEAL REFLUX DISEASE WITHOUT ESOPHAGITIS: Primary | ICD-10-CM

## 2023-11-29 PROCEDURE — 99214 OFFICE O/P EST MOD 30 MIN: CPT | Performed by: NURSE PRACTITIONER

## 2023-11-29 RX ORDER — WHEAT DEXTRIN 5 G/7.4 G
POWDER (GRAM) ORAL
Qty: 248 G | Refills: 1 | Status: SHIPPED | OUTPATIENT
Start: 2023-11-29 | End: 2024-03-06 | Stop reason: WASHOUT

## 2023-11-29 NOTE — PROGRESS NOTES
Subjective   Patient ID: Ludmila Chowdary is a 52 y.o. female who presents for H pylori. PMH of HTN, HLD, GERD  Taking antibiotic now  And forgets to take the medication    Previous visit:   Presents today for GERD. She has a long h/o epigastric pain and persistent H pylori infection.When she eats she will have a lot of fullness, she reports heartburn every day. She denies NSAID useJust started on pantoprazole. This is helping the heartburn. She reports dysphagia, localized to cervical area, with solid foods only. SHe reports regurgitation not vomiting at times with nausea. She denies rectal bleeding, dark tarry stools.Has BM daily, she does have a BM after eating.     Family hx: She denies a family hx of CRC, GI cancer     Review of Systems   All other systems reviewed and are negative.      Objective   Physical Exam  Constitutional:       Appearance: She is normal weight.   Neurological:      Mental Status: She is alert.   Psychiatric:         Mood and Affect: Mood normal.         Assessment/Plan   Diagnoses and all orders for this visit:  Gastroesophageal reflux disease without esophagitis  H. pylori infection  -     H. Pylori Breath Test; Future     This is a 52 year old female with a PMH of HTN, HLD, GERD, presents today for EGD follow up. Found to have H pylori infection.  She did have a prior EGD in 2017 which showed gastritis and H pylori infection. She is currently using antibiotic. She will complete breath test 6-8 weeks after treatment. She will follow up after.     A colonoscopy was ordered a not complete. She continues to have intermittent constipation/diarrhea. She will start fiber.

## 2023-11-30 LAB — BACTERIA UR CULT: NORMAL

## 2023-12-06 ENCOUNTER — DOCUMENTATION (OUTPATIENT)
Dept: PHYSICAL THERAPY | Facility: CLINIC | Age: 53
End: 2023-12-06
Payer: COMMERCIAL

## 2023-12-06 NOTE — PROGRESS NOTES
Physical Therapy    Discharge Summary    Name: Ludmila Chowdary  MRN: [id number]  : 1970  Date: 23    Discharge Summary: PT    Discharge Information: Date of discharge 2023, Date of last visit 10/30/2023, Date of evaluation 2023, Number of attended visits n/a, Referred by Diav, and Referred for Multi joint paint    Therapy Summary: Pt has attended PT for aquatics and land based exercises    Discharge Status: Pt unable to continue PT due to insurance reasons     Rehab Discharge Reason: See above

## 2023-12-18 ENCOUNTER — APPOINTMENT (OUTPATIENT)
Dept: PRIMARY CARE | Facility: CLINIC | Age: 53
End: 2023-12-18
Payer: COMMERCIAL

## 2023-12-19 ENCOUNTER — TELEMEDICINE (OUTPATIENT)
Dept: PRIMARY CARE | Facility: CLINIC | Age: 53
End: 2023-12-19
Payer: COMMERCIAL

## 2023-12-19 DIAGNOSIS — K29.70 HELICOBACTER PYLORI GASTRITIS: ICD-10-CM

## 2023-12-19 DIAGNOSIS — B96.81 HELICOBACTER PYLORI GASTRITIS: ICD-10-CM

## 2023-12-19 DIAGNOSIS — J01.00 ACUTE NON-RECURRENT MAXILLARY SINUSITIS: Primary | ICD-10-CM

## 2023-12-19 DIAGNOSIS — J30.9 ALLERGIC RHINITIS, UNSPECIFIED SEASONALITY, UNSPECIFIED TRIGGER: ICD-10-CM

## 2023-12-19 PROCEDURE — 99441 PR PHYS/QHP TELEPHONE EVALUATION 5-10 MIN: CPT | Performed by: INTERNAL MEDICINE

## 2023-12-19 RX ORDER — AMOXICILLIN AND CLAVULANATE POTASSIUM 500; 125 MG/1; MG/1
500 TABLET, FILM COATED ORAL 3 TIMES DAILY
Qty: 30 TABLET | Refills: 0 | Status: SHIPPED | OUTPATIENT
Start: 2023-12-19 | End: 2023-12-29

## 2023-12-19 RX ORDER — FLUTICASONE FUROATE 27.5 UG/1
2 SPRAY, METERED NASAL
Qty: 10 G | Refills: 1 | Status: SHIPPED | OUTPATIENT
Start: 2023-12-19 | End: 2024-03-06 | Stop reason: WASHOUT

## 2023-12-19 RX ORDER — MONTELUKAST SODIUM 10 MG/1
10 TABLET ORAL
Qty: 30 TABLET | Refills: 1 | Status: SHIPPED | OUTPATIENT
Start: 2023-12-19 | End: 2024-03-06 | Stop reason: WASHOUT

## 2023-12-19 NOTE — PROGRESS NOTES
Assessment/Plan   Problem List Items Addressed This Visit       Acute sinusitis - Primary    Relevant Medications    amoxicillin-pot clavulanate (Augmentin) 500-125 mg tablet    Allergic rhinitis    Relevant Medications    fluticasone (Flonase Sensimist) 27.5 mcg/actuation nasal spray    montelukast (Singulair) 10 mg tablet    Helicobacter pylori gastritis   She had a stopped taking all anti-H. pylori treatment little while ago because she did not tolerate those treatment she should seek additional assessment by gastroenterologist which she is planning for eventually  Today's visit was in relation to left-sided acute maxillary sinusitis  This was giving him significant discomfort  Antibiotic is being prescribed  She has recent dental cleaning and the pain was there at the time and they said it is not coming from the gingival inflammation it is probably coming from the sinus  Allergic rhinitis she has ran out off montelukast and Flonase which is being refilled  Follow-up depending upon the progress otherwise in next 3-month    Subjective   Patient ID: Ludmila Chowdary is a 52 y.o. female who presents for Follow-up (Sx's include sinus issues.  Headache, sinus pain on left side. She has shivers.).    Past Surgical History:   Procedure Laterality Date    CHOLECYSTECTOMY  09/30/2016    Cholecystectomy    OTHER SURGICAL HISTORY  12/18/2018    Gallbladder surgery      Family History   Problem Relation Name Age of Onset    Arthritis Mother      Hypertension Mother      Hypertension Father        Social History     Socioeconomic History    Marital status:      Spouse name: Not on file    Number of children: Not on file    Years of education: Not on file    Highest education level: Not on file   Occupational History    Not on file   Tobacco Use    Smoking status: Never    Smokeless tobacco: Never   Vaping Use    Vaping Use: Never used   Substance and Sexual Activity    Alcohol use: Never    Drug use: Never    Sexual  activity: Not on file   Other Topics Concern    Not on file   Social History Narrative    Not on file     Social Determinants of Health     Financial Resource Strain: Not on file   Food Insecurity: Not on file   Transportation Needs: Not on file   Physical Activity: Not on file   Stress: Not on file   Social Connections: Not on file   Intimate Partner Violence: Not on file   Housing Stability: Not on file      Other and Pollen extracts   Current Outpatient Medications   Medication Sig Dispense Refill    albuterol 90 mcg/actuation inhaler Inhale 2 puffs every 8 hours if needed.      amLODIPine (Norvasc) 10 mg tablet Take 1 tablet (10 mg) by mouth once daily. for blood pressure      atenolol (Tenormin) 25 mg tablet Take 1 tablet (25 mg) by mouth once daily.      atorvastatin (Lipitor) 10 mg tablet Take 1 tablet (10 mg) by mouth once daily.      celecoxib (CeleBREX) 200 mg capsule Take 1 capsule (200 mg) by mouth once daily. 30 capsule 0    ergocalciferol (Vitamin D-2) 1.25 MG (05257 UT) capsule Take by mouth.      fluticasone (Flonase) 50 mcg/actuation nasal spray Administer 2 sprays into each nostril once daily.      leflunomide (Arava) 10 mg tablet Take 1 tablet (10 mg) by mouth once daily.      lidocaine (Lidoderm) 5 % patch Place 1 patch on the skin once daily.      pantoprazole (ProtoNix) 40 mg EC tablet Take 1 tablet (40 mg) by mouth once daily.      peg 400-propylene glycol (Systane Ultra) 0.4-0.3 % drops ophthalmic drops Administer 1 drop into both eyes 3 times a day. 10 mL 4    wheat dextrin (Benefiber Healthy Shape) 5 gram/7.4 gram powder Take 1 teaspoon daily 248 g 1    amoxicillin-pot clavulanate (Augmentin) 500-125 mg tablet Take 1 tablet (500 mg) by mouth 3 times a day for 10 days. 30 tablet 0    betamethasone dipropionate 0.05 % cream Apply topically 2 times a day. Use 5 days on 2 days off. Not for face, armpits, or groin      bismuth subsalicylate (Pink Bismuth) 262 mg tablet Take 2 tablets (524 mg) by  mouth once daily. (Patient not taking: Reported on 12/19/2023) 28 tablet 0    blood sugar diagnostic (True Metrix Glucose Test Strip) strip once daily.      BUDESONIDE INHL Inhale 2 puffs 2 times a day.      ferrous sulfate 143 mg (45 mg iron) ER tablet       fluticasone (Flonase Sensimist) 27.5 mcg/actuation nasal spray Administer 2 sprays into each nostril once daily. 10 g 1    guaiFENesin (Humibid 3) 400 mg tablet       hydroCHLOROthiazide (HYDRODiuril) 25 mg tablet       ketoconazole (NIZOral) 2 % shampoo       Lidocaine Pain Relief 4 % patch Place on the skin once daily as needed.      Lidocaine Viscous 2 % solution RINSE MOUTH WITH 1/2 TEASPOONFUL EVERY 4 HOURS AND SPIT OUT      lisinopril 10 mg tablet Take by mouth.      metFORMIN (Glucophage) 500 mg tablet Take 1 tablet (500 mg) by mouth every 12 hours. Take with food.      montelukast (Singulair) 10 mg tablet Take 1 tablet (10 mg) by mouth once daily. 30 tablet 1    omeprazole (PriLOSEC) 40 mg DR capsule Take 1 capsule (40 mg) by mouth once daily in the morning. Take before meals for 14 days. Do not crush or chew. 14 capsule 0    predniSONE (Deltasone) 20 mg tablet Take 0.5 tablets (10 mg) by mouth once daily.      propylene glycoL (Systane Complete) 0.6 % drops Administer 1 drop into both eyes 3 times a day.      propylene glycoL 0.6 % drops Administer into affected eye(s).      sod sulf-pot chloride-mag sulf (Sutab) 1.479-0.188- 0.225 gram tablet Take by mouth.      tolterodine (Detrol) 2 mg tablet Take 1 tablet (2 mg) by mouth once daily at bedtime.      traZODone (Desyrel) 50 mg tablet Take 1 tablet (50 mg) by mouth once daily at bedtime.       No current facility-administered medications for this visit.      There were no vitals filed for this visit.   Problem List Items Addressed This Visit       Acute sinusitis - Primary    Relevant Medications    amoxicillin-pot clavulanate (Augmentin) 500-125 mg tablet    Allergic rhinitis    Relevant Medications  "   fluticasone (Flonase Sensimist) 27.5 mcg/actuation nasal spray    montelukast (Singulair) 10 mg tablet    Helicobacter pylori gastritis      No orders of the defined types were placed in this encounter.       HPI  This is a pleasant 52-year-old female with history of allergic rhinitis and currently has ran out of Flonase and montelukast and she had to go to [place] on urgent basis for few days and she noted that she has been having this pain going on for last 6 days or not improving  She had recent dental workup done at the time she had pain and at that time gingival inflammation was not considered to be the cause of the pain was considered but may be sinus  She had no fever she has no nausea vomiting or diarrhea  She has a stopped taking anti-H. pylori treatment little while ago when the H. pylori gastritis was diagnosed by gastroenterologist but the medications were giving side effect and that is why she could not complete the treatment    ROS  As mentioned above  Past medical history reviewed  Social and family history reviewed  Allergies and medications reviewed  Recent labs reviewed  Vital signs not done  PHYSICAL EXAM  Telephone assessment that she was awake alert orientated without any significant distress but was complaining about left-sided facial pain and tenderness in the maxillary antrum area and also in the GI area  Total time on the telephone was 8 minutes      No results found for: \"PR1\", \"BMPR1A\", \"CMPLAS\", \"OG7SNFUC\", \"KPSAT\"   Lab Results   Component Value Date    CHOL 158 08/16/2023    CHHDL 3.6 08/16/2023                "

## 2024-02-15 ENCOUNTER — HOSPITAL ENCOUNTER (EMERGENCY)
Facility: HOSPITAL | Age: 54
Discharge: HOME | End: 2024-02-15
Attending: STUDENT IN AN ORGANIZED HEALTH CARE EDUCATION/TRAINING PROGRAM
Payer: COMMERCIAL

## 2024-02-15 ENCOUNTER — HOSPITAL ENCOUNTER (OUTPATIENT)
Dept: CARDIOLOGY | Facility: HOSPITAL | Age: 54
Discharge: HOME | End: 2024-02-15
Payer: COMMERCIAL

## 2024-02-15 ENCOUNTER — APPOINTMENT (OUTPATIENT)
Dept: RADIOLOGY | Facility: HOSPITAL | Age: 54
End: 2024-02-15
Payer: COMMERCIAL

## 2024-02-15 VITALS
SYSTOLIC BLOOD PRESSURE: 152 MMHG | RESPIRATION RATE: 18 BRPM | WEIGHT: 156 LBS | BODY MASS INDEX: 31.45 KG/M2 | HEART RATE: 86 BPM | HEIGHT: 59 IN | OXYGEN SATURATION: 98 % | TEMPERATURE: 97.9 F | DIASTOLIC BLOOD PRESSURE: 84 MMHG

## 2024-02-15 DIAGNOSIS — K21.9 GASTROESOPHAGEAL REFLUX DISEASE, UNSPECIFIED WHETHER ESOPHAGITIS PRESENT: Primary | ICD-10-CM

## 2024-02-15 DIAGNOSIS — M25.519 SHOULDER PAIN: ICD-10-CM

## 2024-02-15 DIAGNOSIS — G47.9 SLEEP DIFFICULTIES: ICD-10-CM

## 2024-02-15 LAB
ALBUMIN SERPL BCP-MCNC: 4.3 G/DL (ref 3.4–5)
ALP SERPL-CCNC: 92 U/L (ref 33–110)
ALT SERPL W P-5'-P-CCNC: 29 U/L (ref 7–45)
ANION GAP SERPL CALC-SCNC: 9 MMOL/L (ref 10–20)
APPEARANCE UR: CLEAR
AST SERPL W P-5'-P-CCNC: 21 U/L (ref 9–39)
BASOPHILS # BLD AUTO: 0.03 X10*3/UL (ref 0–0.1)
BASOPHILS NFR BLD AUTO: 0.5 %
BILIRUB SERPL-MCNC: 0.3 MG/DL (ref 0–1.2)
BILIRUB UR STRIP.AUTO-MCNC: NEGATIVE MG/DL
BUN SERPL-MCNC: 21 MG/DL (ref 6–23)
CALCIUM SERPL-MCNC: 9.6 MG/DL (ref 8.6–10.3)
CARDIAC TROPONIN I PNL SERPL HS: <3 NG/L (ref 0–13)
CHLORIDE SERPL-SCNC: 103 MMOL/L (ref 98–107)
CO2 SERPL-SCNC: 29 MMOL/L (ref 21–32)
COLOR UR: NORMAL
CREAT SERPL-MCNC: 0.63 MG/DL (ref 0.5–1.05)
EGFRCR SERPLBLD CKD-EPI 2021: >90 ML/MIN/1.73M*2
EOSINOPHIL # BLD AUTO: 0.1 X10*3/UL (ref 0–0.7)
EOSINOPHIL NFR BLD AUTO: 1.8 %
ERYTHROCYTE [DISTWIDTH] IN BLOOD BY AUTOMATED COUNT: 14.4 % (ref 11.5–14.5)
GLUCOSE SERPL-MCNC: 101 MG/DL (ref 74–99)
GLUCOSE UR STRIP.AUTO-MCNC: NEGATIVE MG/DL
HCT VFR BLD AUTO: 40 % (ref 36–46)
HGB BLD-MCNC: 12.7 G/DL (ref 12–16)
IMM GRANULOCYTES # BLD AUTO: 0.01 X10*3/UL (ref 0–0.7)
IMM GRANULOCYTES NFR BLD AUTO: 0.2 % (ref 0–0.9)
KETONES UR STRIP.AUTO-MCNC: NEGATIVE MG/DL
LEUKOCYTE ESTERASE UR QL STRIP.AUTO: NEGATIVE
LIPASE SERPL-CCNC: 39 U/L (ref 9–82)
LYMPHOCYTES # BLD AUTO: 2.22 X10*3/UL (ref 1.2–4.8)
LYMPHOCYTES NFR BLD AUTO: 39.4 %
MCH RBC QN AUTO: 26 PG (ref 26–34)
MCHC RBC AUTO-ENTMCNC: 31.8 G/DL (ref 32–36)
MCV RBC AUTO: 82 FL (ref 80–100)
MONOCYTES # BLD AUTO: 0.41 X10*3/UL (ref 0.1–1)
MONOCYTES NFR BLD AUTO: 7.3 %
NEUTROPHILS # BLD AUTO: 2.87 X10*3/UL (ref 1.2–7.7)
NEUTROPHILS NFR BLD AUTO: 50.8 %
NITRITE UR QL STRIP.AUTO: NEGATIVE
NRBC BLD-RTO: 0 /100 WBCS (ref 0–0)
PH UR STRIP.AUTO: 6 [PH]
PLATELET # BLD AUTO: 244 X10*3/UL (ref 150–450)
POTASSIUM SERPL-SCNC: 4 MMOL/L (ref 3.5–5.3)
PROT SERPL-MCNC: 7.5 G/DL (ref 6.4–8.2)
PROT UR STRIP.AUTO-MCNC: NEGATIVE MG/DL
RBC # BLD AUTO: 4.89 X10*6/UL (ref 4–5.2)
RBC # UR STRIP.AUTO: NEGATIVE /UL
SODIUM SERPL-SCNC: 137 MMOL/L (ref 136–145)
SP GR UR STRIP.AUTO: 1.01
UROBILINOGEN UR STRIP.AUTO-MCNC: <2 MG/DL
WBC # BLD AUTO: 5.6 X10*3/UL (ref 4.4–11.3)

## 2024-02-15 PROCEDURE — 2500000005 HC RX 250 GENERAL PHARMACY W/O HCPCS: Performed by: STUDENT IN AN ORGANIZED HEALTH CARE EDUCATION/TRAINING PROGRAM

## 2024-02-15 PROCEDURE — 85025 COMPLETE CBC W/AUTO DIFF WBC: CPT | Performed by: STUDENT IN AN ORGANIZED HEALTH CARE EDUCATION/TRAINING PROGRAM

## 2024-02-15 PROCEDURE — 81003 URINALYSIS AUTO W/O SCOPE: CPT | Performed by: STUDENT IN AN ORGANIZED HEALTH CARE EDUCATION/TRAINING PROGRAM

## 2024-02-15 PROCEDURE — 36415 COLL VENOUS BLD VENIPUNCTURE: CPT | Performed by: STUDENT IN AN ORGANIZED HEALTH CARE EDUCATION/TRAINING PROGRAM

## 2024-02-15 PROCEDURE — 71046 X-RAY EXAM CHEST 2 VIEWS: CPT | Performed by: RADIOLOGY

## 2024-02-15 PROCEDURE — 99283 EMERGENCY DEPT VISIT LOW MDM: CPT | Mod: 25

## 2024-02-15 PROCEDURE — 84484 ASSAY OF TROPONIN QUANT: CPT | Performed by: STUDENT IN AN ORGANIZED HEALTH CARE EDUCATION/TRAINING PROGRAM

## 2024-02-15 PROCEDURE — 83690 ASSAY OF LIPASE: CPT | Performed by: STUDENT IN AN ORGANIZED HEALTH CARE EDUCATION/TRAINING PROGRAM

## 2024-02-15 PROCEDURE — 93005 ELECTROCARDIOGRAM TRACING: CPT

## 2024-02-15 PROCEDURE — 2500000001 HC RX 250 WO HCPCS SELF ADMINISTERED DRUGS (ALT 637 FOR MEDICARE OP): Performed by: STUDENT IN AN ORGANIZED HEALTH CARE EDUCATION/TRAINING PROGRAM

## 2024-02-15 PROCEDURE — 80053 COMPREHEN METABOLIC PANEL: CPT | Performed by: STUDENT IN AN ORGANIZED HEALTH CARE EDUCATION/TRAINING PROGRAM

## 2024-02-15 PROCEDURE — 71046 X-RAY EXAM CHEST 2 VIEWS: CPT

## 2024-02-15 PROCEDURE — 99284 EMERGENCY DEPT VISIT MOD MDM: CPT | Performed by: STUDENT IN AN ORGANIZED HEALTH CARE EDUCATION/TRAINING PROGRAM

## 2024-02-15 RX ORDER — FAMOTIDINE 20 MG/1
20 TABLET, FILM COATED ORAL 2 TIMES DAILY PRN
Qty: 30 TABLET | Refills: 0 | Status: SHIPPED | OUTPATIENT
Start: 2024-02-15 | End: 2024-02-15 | Stop reason: SDUPTHER

## 2024-02-15 RX ORDER — FAMOTIDINE 20 MG/1
20 TABLET, FILM COATED ORAL 2 TIMES DAILY PRN
Qty: 30 TABLET | Refills: 0 | Status: SHIPPED | OUTPATIENT
Start: 2024-02-15 | End: 2024-03-01

## 2024-02-15 RX ORDER — FAMOTIDINE 20 MG/1
40 TABLET, FILM COATED ORAL ONCE
Status: COMPLETED | OUTPATIENT
Start: 2024-02-15 | End: 2024-02-15

## 2024-02-15 RX ORDER — MELATONIN 5 MG
5 CAPSULE ORAL NIGHTLY
Qty: 30 CAPSULE | Refills: 0 | Status: SHIPPED | OUTPATIENT
Start: 2024-02-15 | End: 2024-03-16

## 2024-02-15 RX ADMIN — DIPHENHYDRAMINE HYDROCHLORIDE AND LIDOCAINE HYDROCHLORIDE AND ALUMINUM HYDROXIDE AND MAGNESIUM HYDRO 10 ML: KIT at 20:03

## 2024-02-15 RX ADMIN — FAMOTIDINE 40 MG: 20 TABLET ORAL at 20:04

## 2024-02-15 ASSESSMENT — COLUMBIA-SUICIDE SEVERITY RATING SCALE - C-SSRS
1. IN THE PAST MONTH, HAVE YOU WISHED YOU WERE DEAD OR WISHED YOU COULD GO TO SLEEP AND NOT WAKE UP?: NO
6. HAVE YOU EVER DONE ANYTHING, STARTED TO DO ANYTHING, OR PREPARED TO DO ANYTHING TO END YOUR LIFE?: NO
2. HAVE YOU ACTUALLY HAD ANY THOUGHTS OF KILLING YOURSELF?: NO

## 2024-02-15 NOTE — ED TRIAGE NOTES
Pt presents to ED for report of heartburn for the past few days with right sided neck  and shoulder pain for the past 2 days. Pt reports arthritis in right shoulder, right leg and left knee. Pt reports feeling lightheaded. Pt denies CP, reports pain in upper back. Reports leg swelling and SOB in the mornings. Pt adds that she has been experiencing urinary frequency

## 2024-02-16 LAB — HOLD SPECIMEN: NORMAL

## 2024-02-16 NOTE — ED PROVIDER NOTES
"HPI:  Patient is a 53-year-old female with history of polyjoint arthritis, obesity, hyperlipidemia, GERD, non-insulin-dependent diabetes, asthma, hypertension who presents with substernal chest pain.  Patient describes her pain as \"burning\" and states it is exacerbated with laying flat.  She does intermittently do self-induced vomiting which she states provided temporary relief.  Patient reports nausea.  No diarrhea.  No abdominal pain.  No back pain.  No shortness of breath or cough.  Of note, patient states she has been taking NSAIDs for \"years\" secondary to her arthritic pain.    ROS: A 10-system ROS was performed and was negative except as documented in the HPI.    PMH/PSH: Reviewed in EMR. As above in HPI.  SH: Denies EtOH, tobacco or illicit drug use.  Allergies:   Allergies   Allergen Reactions    Other Shortness of breath     SWEETS & PERFUMES    Pollen Extracts Itching     Runny nose  - sneezing      Medications: See prescription writer for full medication list.     General: no acute distress, appropriate conversation  HEENT:  No rhinorrhea. MMM.  Cardiac: regular rate rhythm, no murmurs  Chest: reproducible sternal tenderness to palpation without bony deformities or stepoffs. No crepitus.   Pulm:  normal respiratory effort on room air, equal chest expansion, clear bilaterally, no wheeze or crackles  GI: soft, nontender, no guarding or rigidty, obese but nondistended, +BS  Extremities:  moves all extremities freely, no edema noted  Skin: warm, well-perfused, no lesions noted on exposed skin.  Neuro:  AOx3, moves all 4 extremities freely and independently     DDX: Includes but not limited to ACS versus GERD exacerbation versus gastritis versus pancreatitis versus other.    Assessment/Plan/MDM  Patient is a 53-year-old female with history of Navya joint arthritis, obesity, hyperlipidemia, GERD, non-insulin-dependent diabetes, asthma, hypertension who presents with substernal chest pain.  There is no " leukocytosis, anemia or gross electrolyte derangement.  Initial troponin negative.  Patient complained of increased urinary frequency to triage nurse, UA ordered did not reveal glucosuria or evidence of UTI.  Lipase within normal limits.  Chest x-ray without acute cardiopulmonary process.  Patient reports relief with Pepcid and BMX.  Advised to decrease her NSAID intake, as this may develop gastric ulcers.  Patient discharged in stable condition with Pepcid prescription and advised to follow-up with her primary care physician in the outpatient setting.  She did report right-sided neck pain which radiates down to her right upper extremity.  No weakness or paresthesias.  Patient reports doing frequent housework, and may have cervical radiculopathy.  I recommended outpatient follow-up with a possible MRI to the patient and she acknowledged.  Right upper extremity is neurovascularly intact.  Normal bilateral sensation, normal  strength.    EKG shows normal sinus rhythm, rate 86, left axis deviation, normal WI intervals, normal QTc, no ST elevation, low voltage in lateral leads    ED Course/Progress:    ED Course as of 02/16/24 0005   Thu Feb 15, 2024   2053 Interpreted by the Emergency Department Attending: ECG revealed normal sinus rhythm at a rate of 86 beats per minute with WI interval 137 , QRS of 78 , QTc of 420.  No acute injury pattern.  No previous EKG to compare. [MG]      ED Course User Index  [MG] Varinder Crockett DO         Diagnoses as of 02/16/24 0005   Gastroesophageal reflux disease, unspecified whether esophagitis present   Sleep difficulties        Clinical Impression: as above  Dispo:   Home: I discussed the differential, results and discharge plan with the patient.  I emphasized the importance of follow-up with PCP next week.  I explained reasons for the patient to return to the Emergency Department.  Questions were addressed.  They understand return precautions and discharge instructions. The  patient expressed understanding and agreement with assessment/plan.     Pt seen and discussed with attending physician, Dr. Keira Hinson MD  PGY3, Emergency Medicine    Disclaimer: This note was dictated by speech recognition. An attempt at proof reading was made to minimize errors. Errors in transcription may be present.  Please call if questions.      Roseanna Hinson MD  Resident  02/15/24 2225  ---------------------  I did evaluate the patient independently from the resident and was present for key medical decision making.  Agree with disposition.  Any discrepancies between residents findings are detailed below.    VS: As documented in the triage note from today's date and EMR flowsheet were reviewed.  Gen: Well developed. No acute distress. Seated in bed. Appears nontoxic.   Skin: Warm. Dry. Intact. No rashes or lesions.  Eyes: Pupils equally round and reactive to light. Clear sclera. EOMI.  HENT: Atraumatic appearance. Mucosal membranes moist. No oral lesions, uvula midline, airway patent.   CV: Regular rate and regular rhythm. S1, S2. No pedal edema. Warm extremities.  Resp: Nonlabored breathing Clear to auscultation bilaterally. No increased work of breathing.   GI: Soft and nontender. No rebound or guarding. Bowel sounds x4 present.  Shaikh sign McBurney's point tenderness is negative no CVA tenderness no Brunson Hernandez or Arjun sign present.  MSK: Symmetric muscle bulk. No joint swelling in the extremities. Compartments are soft. Neurovascularly intact x4 extremities. Radial pulses +2 equal bilaterally.  Pedal pulses +2 equal bilaterally.  Neuro: Alert. CN II - XII intact. Speech fluent. Moving all extremities. No focal deficits. Gait normal.  Psych: Appropriate. Kempt.    Patient overall well-appearing 53-year-old female on my examination she does have chronic osteoarthritis throughout the hands as well as right foot.  There is no evidence of calf pain or evidence of DVT on physical examination.   Neurovascular intact x 4 extremities.  She has had a burning sensation in epigastric region exacerbated while lying flat.  She has been taking NSAIDs for quite some time.  She was recommended discontinuation of this as it could be related to the burning sensation or related to an ulcer.  She was given Pepcid as well as BMX cocktail and felt significantly improved.  There is no evidence of anemia or leukocytosis making infectious etiology less likely no evidence of UTI on urinalysis either.  Cardiac troponin is negative no acute injury pattern on EKG doubt atypical ACS.  She is recommended close follow-up with her primary physician as well as GI recommendations to discontinue NSAID therapy continue antacids at home.  She is appreciative of care agreeable with this plan.    HEART Score:    History:   [x  ] Slightly suspicious - 0   [  ] Moderately suspicious - 1  [  ] Highly suspicious - 2     EKG:   [ x ] Normal - 0    [  ] Non-specific repolarization disturbance (No ST changes, but LBBB, LVH, repolarization changes)  - 1   [  ] Significant ST deviation (ST changes not d/t LBBB, LVH, digoxin)  - 2     Age:   [  ] < 45 - 0   [ x ] 45-64 - 1   [  ] > 65 - 2     Risk Factors:     HTN, HLD, DM, obesity (BMI > 30), smoking (current or w/I 3mo), + fmx (before age of 65), CAD, prior MI, PCI/CABG, CVA/TIA, PAD  [  ] No known risk factors - 0   [  ] 1-2 risk factors - 1    [ x ] >3 risk factors or hx of atherosclerotic disease - 2     Initial troponin:  [ x ] < normal limit - 0   [  ] 1 - 3x normal limit - 1   [  ] > 3x normal limit - 2    Total:   [ x] 0-3 Points: 1.7% risk of major adverse cardiac event in 6 weeks  [ ] 4-6 Points: 12-16.6% risk of major adverse cardiac event in 6 weeks  [ ] 7-10 Points 50-65% risk of major adverse cardiac event in 6 weeks    I did discuss the heart score results with the patient.  We did discuss the risk stratification. I did discuss that the patient's risk based on the above scoring and  their risk of coronary artery disease. The patient is comfortable being discharged home and following up with the appropriate physicians. This is shared decision making. They're to return to the nearest emergency room for any new or worsening symptoms.    DO Varinder Salguero DO  02/16/24 0008

## 2024-02-16 NOTE — DISCHARGE INSTRUCTIONS
Try not to take as much ibuprofen, this may be causing a stomach ulcer.  Take Pepcid as needed for pain.  Follow-up with orthopedics or your primary care physician as needed, you may need an MRI of your neck due to a pinched nerve causing pain in your shoulder.

## 2024-02-21 PROCEDURE — 93005 ELECTROCARDIOGRAM TRACING: CPT

## 2024-02-24 DIAGNOSIS — I10 ESSENTIAL (PRIMARY) HYPERTENSION: ICD-10-CM

## 2024-02-24 LAB
ATRIAL RATE: 86 BPM
P AXIS: 38 DEGREES
PR INTERVAL: 137 MS
Q ONSET: 252 MS
QRS COUNT: 14 BEATS
QRS DURATION: 78 MS
QT INTERVAL: 351 MS
QTC CALCULATION(BAZETT): 420 MS
QTC FREDERICIA: 396 MS
R AXIS: 5 DEGREES
T AXIS: 10 DEGREES
T OFFSET: 427 MS
VENTRICULAR RATE: 86 BPM

## 2024-02-26 RX ORDER — AMLODIPINE BESYLATE 10 MG/1
10 TABLET ORAL DAILY
Qty: 90 TABLET | Refills: 1 | Status: SHIPPED | OUTPATIENT
Start: 2024-02-26

## 2024-02-26 RX ORDER — ATENOLOL 25 MG/1
25 TABLET ORAL DAILY
Qty: 90 TABLET | Refills: 1 | Status: SHIPPED | OUTPATIENT
Start: 2024-02-26

## 2024-03-01 ENCOUNTER — OFFICE VISIT (OUTPATIENT)
Dept: GASTROENTEROLOGY | Facility: CLINIC | Age: 54
End: 2024-03-01
Payer: COMMERCIAL

## 2024-03-01 ENCOUNTER — LAB (OUTPATIENT)
Dept: LAB | Facility: LAB | Age: 54
End: 2024-03-01
Payer: COMMERCIAL

## 2024-03-01 VITALS
BODY MASS INDEX: 32.72 KG/M2 | DIASTOLIC BLOOD PRESSURE: 79 MMHG | HEART RATE: 78 BPM | SYSTOLIC BLOOD PRESSURE: 135 MMHG | WEIGHT: 162 LBS | TEMPERATURE: 97.2 F

## 2024-03-01 DIAGNOSIS — A04.8 H. PYLORI INFECTION: ICD-10-CM

## 2024-03-01 DIAGNOSIS — R10.13 EPIGASTRIC DISCOMFORT: ICD-10-CM

## 2024-03-01 DIAGNOSIS — K21.9 GASTROESOPHAGEAL REFLUX DISEASE WITHOUT ESOPHAGITIS: Primary | ICD-10-CM

## 2024-03-01 LAB — LIPASE SERPL-CCNC: 40 U/L (ref 9–82)

## 2024-03-01 PROCEDURE — 83690 ASSAY OF LIPASE: CPT

## 2024-03-01 PROCEDURE — 3078F DIAST BP <80 MM HG: CPT | Performed by: INTERNAL MEDICINE

## 2024-03-01 PROCEDURE — 1036F TOBACCO NON-USER: CPT | Performed by: INTERNAL MEDICINE

## 2024-03-01 PROCEDURE — 99214 OFFICE O/P EST MOD 30 MIN: CPT | Performed by: INTERNAL MEDICINE

## 2024-03-01 PROCEDURE — 3075F SYST BP GE 130 - 139MM HG: CPT | Performed by: INTERNAL MEDICINE

## 2024-03-01 PROCEDURE — 83013 H PYLORI (C-13) BREATH: CPT

## 2024-03-01 PROCEDURE — 36415 COLL VENOUS BLD VENIPUNCTURE: CPT

## 2024-03-01 RX ORDER — LANSOPRAZOLE 30 MG/1
30 CAPSULE, DELAYED RELEASE ORAL DAILY
Qty: 30 CAPSULE | Refills: 1 | Status: SHIPPED | OUTPATIENT
Start: 2024-03-01 | End: 2024-03-26 | Stop reason: SDUPTHER

## 2024-03-01 ASSESSMENT — ENCOUNTER SYMPTOMS
EYE PAIN: 0
ROS GI COMMENTS: SEE HPI
ARTHRALGIAS: 0
CHILLS: 0
FEVER: 0
SHORTNESS OF BREATH: 0
WEAKNESS: 0
UNEXPECTED WEIGHT CHANGE: 0

## 2024-03-01 ASSESSMENT — PAIN SCALES - GENERAL: PAINLEVEL: 4

## 2024-03-01 NOTE — PATIENT INSTRUCTIONS
Check H. Pylori breath test and lipase.  If negative, will consider a barium esophagram study.  Prescribe lansoprazole (prevacid) 30 mg once daily, best taken before breakfast.  Follow-up in 2-3 months.

## 2024-03-01 NOTE — PROGRESS NOTES
Subjective     History of Present Illness:   Ludmila Chowdary is a 53 y.o. female with PMH of hypertension, hyperlipidemia, diabetes mellitus, rheumatoid arthritis, H. Pylori infection, and GERD who presented to GI clinic for follow-up.  Patient recent saw Malorie Alfaro in GI office with complaints of epigastric pain and nausea/vomiting.  EGD on 10/23/23 was normal but gastric biopsies showed presence of H. pylori. She was prescribed quadruple therapy but she's not sure how much of the medications she took since she developed a rash and diarrhea while on these medications.      Currently, patient reports having daily heartburn, epigastric discomfort, and nausea/vomiting.  Patient denies diarrhea or weight loss.  She does take pantoprazole daily and famotidine as needed.  She also takes peptol-bismol every several hours.  None of these medications are helping her symptoms.  She denies prior colonoscopy and is not interested in any colonoscopy even for screening reasons.  She has increased stress from the family lately.    Review of Systems  Review of Systems   Constitutional:  Negative for chills, fever and unexpected weight change.   HENT:  Negative for mouth sores.    Eyes:  Negative for pain.   Respiratory:  Negative for shortness of breath.    Cardiovascular:  Negative for chest pain.   Gastrointestinal:         See HPI   Musculoskeletal:  Negative for arthralgias.   Skin:  Negative for rash.   Neurological:  Negative for weakness.       Past Medical History   has a past medical history of Hypertension and Other specified bacterial intestinal infections (09/30/2016).     Social History   reports that she has never smoked. She has never used smokeless tobacco. She reports that she does not drink alcohol and does not use drugs.     Family History  family history includes Arthritis in her mother; Hypertension in her father and mother.     Allergies  Allergies   Allergen Reactions    Other Shortness of breath     SWEETS &  PERFUMES    Pollen Extracts Itching     Runny nose  - sneezing       Medications  Current Outpatient Medications   Medication Instructions    amLODIPine (NORVASC) 10 mg, oral, Daily, for blood pressure    atenolol (TENORMIN) 25 mg, oral, Daily    atorvastatin (LIPITOR) 10 mg, oral, Daily    bismuth subsalicylate (PINK BISMUTH) 524 mg, oral, Daily    blood sugar diagnostic (True Metrix Glucose Test Strip) strip Daily    ergocalciferol (Vitamin D-2) 1.25 MG (23486 UT) capsule oral    famotidine (PEPCID) 20 mg, oral, 2 times daily PRN    ferrous sulfate 143 mg (45 mg iron) ER tablet     fluticasone (Flonase Sensimist) 27.5 mcg/actuation nasal spray 2 sprays, Each Nostril, Daily RT    lansoprazole (PREVACID) 30 mg, oral, Daily, Do not crush or chew.    leflunomide (ARAVA) 10 mg, oral, Daily    Lidocaine Pain Relief 4 % patch transdermal, Daily PRN    melatonin 5 mg, oral, Nightly    montelukast (SINGULAIR) 10 mg, oral, Daily RT    omeprazole (PRILOSEC) 40 mg, oral, Daily before breakfast, Do not crush or chew.    pantoprazole (PROTONIX) 40 mg, oral, Daily    peg 400-propylene glycol (Systane Ultra) 0.4-0.3 % drops ophthalmic drops 1 drop, Both Eyes, 3 times daily    propylene glycoL 0.6 % drops ophthalmic (eye)    traZODone (Desyrel) 50 mg tablet 1 tablet, oral, Nightly    wheat dextrin (Benefiber Healthy Shape) 5 gram/7.4 gram powder Take 1 teaspoon daily        Objective   Visit Vitals  /79   Pulse 78   Temp 36.2 °C (97.2 °F)      Physical Exam  Constitutional:       Appearance: Normal appearance.   HENT:      Head: Normocephalic and atraumatic.   Eyes:      General: No scleral icterus.  Cardiovascular:      Rate and Rhythm: Normal rate and regular rhythm.   Pulmonary:      Effort: Pulmonary effort is normal.      Breath sounds: Normal breath sounds. No wheezing.   Abdominal:      General: Bowel sounds are normal.      Palpations: Abdomen is soft. There is no mass.      Tenderness: There is abdominal tenderness.  "There is no guarding or rebound.      Comments: Epigastric tenderness to palpation   Musculoskeletal:         General: Normal range of motion.      Cervical back: Normal range of motion.   Skin:     Coloration: Skin is not jaundiced.   Neurological:      Mental Status: She is alert and oriented to person, place, and time.         Labs  Lab Results   Component Value Date    WBC 5.6 02/15/2024    HGB 12.7 02/15/2024    HCT 40.0 02/15/2024    MCV 82 02/15/2024     02/15/2024     Lab Results   Component Value Date    GLUCOSE 101 (H) 02/15/2024    CALCIUM 9.6 02/15/2024     02/15/2024    K 4.0 02/15/2024    CO2 29 02/15/2024     02/15/2024    BUN 21 02/15/2024    CREATININE 0.63 02/15/2024     Lab Results   Component Value Date    ALT 29 02/15/2024    AST 21 02/15/2024    ALKPHOS 92 02/15/2024    BILITOT 0.3 02/15/2024     Lab Results   Component Value Date    CRP 0.52 08/26/2019     No results found for: \"CALPS\"    Assessment/Plan   Ludmila Chowdary is a 53 y.o. female with PMH of hypertension, hyperlipidemia, diabetes mellitus, H. pylori, rheumatoid arthritis, s/p cholecystectomy, and GERD who presented to GI clinic for follow-up.  Patient complained of persistent heartburn, epigastric discomfort, and nausea/vomiting.  She takes pantoprazole daily and famotidine as needed but this has not helped.  Recent EGD showed H. Pylori infection but it's unclear if she took all of her quadruple therapy due to side effects.  I will check H. Pylori breath test.  If positive, we will prescribe a different therapy for H. Pylori.  If negative, will consider barium esophagram study to rule out dysmotility disorder.  She may benefit from manometry/pH study in the future fo evaluate for functional heartburn.  Differentials include functional heartburn, dyspesia, or H. Pylori infection.  Prescribe lansoprazole once daily for GERD.   Follow-up in 2-3 months.      Patient Instructions  Check H. Pylori breath test and " lipase.  If negative, will consider a barium esophagram study.  Prescribe lansoprazole (prevacid) 30 mg once daily, best taken before breakfast.  Follow-up in 2-3 months.    Migue Jerez MD

## 2024-03-02 LAB — UREA BREATH TEST QL: NEGATIVE

## 2024-03-06 ENCOUNTER — OFFICE VISIT (OUTPATIENT)
Dept: PRIMARY CARE | Facility: CLINIC | Age: 54
End: 2024-03-06
Payer: COMMERCIAL

## 2024-03-06 VITALS
HEART RATE: 83 BPM | DIASTOLIC BLOOD PRESSURE: 84 MMHG | SYSTOLIC BLOOD PRESSURE: 131 MMHG | WEIGHT: 164.6 LBS | HEIGHT: 59 IN | BODY MASS INDEX: 33.18 KG/M2 | TEMPERATURE: 97.2 F

## 2024-03-06 DIAGNOSIS — Z00.00 WELL ADULT HEALTH CHECK: ICD-10-CM

## 2024-03-06 DIAGNOSIS — M19.011 OSTEOARTHRITIS OF RIGHT SHOULDER, UNSPECIFIED OSTEOARTHRITIS TYPE: ICD-10-CM

## 2024-03-06 DIAGNOSIS — I10 BENIGN ESSENTIAL HYPERTENSION: Primary | ICD-10-CM

## 2024-03-06 DIAGNOSIS — M06.4: ICD-10-CM

## 2024-03-06 DIAGNOSIS — K21.9 GASTROESOPHAGEAL REFLUX DISEASE WITHOUT ESOPHAGITIS: ICD-10-CM

## 2024-03-06 DIAGNOSIS — E78.00 HYPERCHOLESTEREMIA: ICD-10-CM

## 2024-03-06 PROBLEM — Z86.19 HISTORY OF HELICOBACTER PYLORI INFECTION: Status: ACTIVE | Noted: 2024-03-06

## 2024-03-06 PROBLEM — H02.886 MEIBOMIAN GLAND DYSFUNCTION (MGD) OF LEFT EYE: Status: ACTIVE | Noted: 2024-03-06

## 2024-03-06 PROBLEM — G47.9 DIFFICULTY SLEEPING: Status: ACTIVE | Noted: 2024-03-06

## 2024-03-06 PROBLEM — M67.919 DISORDER OF TENDON OF SHOULDER REGION: Status: ACTIVE | Noted: 2024-03-06

## 2024-03-06 PROBLEM — R19.8 ALTERNATING CONSTIPATION AND DIARRHEA: Status: ACTIVE | Noted: 2023-11-29

## 2024-03-06 PROCEDURE — 3079F DIAST BP 80-89 MM HG: CPT | Performed by: INTERNAL MEDICINE

## 2024-03-06 PROCEDURE — 99214 OFFICE O/P EST MOD 30 MIN: CPT | Performed by: INTERNAL MEDICINE

## 2024-03-06 PROCEDURE — 3075F SYST BP GE 130 - 139MM HG: CPT | Performed by: INTERNAL MEDICINE

## 2024-03-06 PROCEDURE — 1036F TOBACCO NON-USER: CPT | Performed by: INTERNAL MEDICINE

## 2024-03-06 RX ORDER — GUAIFENESIN 600 MG/1
600 TABLET, EXTENDED RELEASE ORAL 2 TIMES DAILY
Qty: 60 TABLET | Refills: 0 | Status: SHIPPED | OUTPATIENT
Start: 2024-03-06

## 2024-03-06 NOTE — PROGRESS NOTES
Assessment/Plan   Problem List Items Addressed This Visit       DJD (degenerative joint disease)    Benign essential hypertension - Primary    Relevant Orders    Comprehensive Metabolic Panel    GERD (gastroesophageal reflux disease)    Relevant Medications    guaiFENesin (Mucinex) 600 mg 12 hr tablet    Hypercholesteremia    Relevant Orders    Comprehensive Metabolic Panel    Lipid Panel    Inflammatory polyarthropathy or polyarthritis (CMS/HCC)     Other Visit Diagnoses       Well adult health check        Relevant Orders    Comprehensive Metabolic Panel        #1 is status post visit to the ED with shoulder pain neck pain and cervical radiculopathic pain she has seen her rheumatologist who has advised to see neurosurgeon but she had some problems she will discuss with the rheumatologist  #2 H. pylori infection with gastroesophageal reflux disease she has seen gastroenterologist and recent testing has shown no evidence of active infection as she has been treated to partially as far as the course of treatment is concerned due to side effect  Her symptoms remains a problem of reflux and sometimes waterbrash she has been advised to call the gastroenterologist to discuss as he is planning to do some barium studies and pH testing  #3 hypercholesterolemia we need to repeat labs as ordered  #4 she has a history of inflammatory polyarthritis and follows rheumatologist and continue to follow  #5 benign essential hypertension controlled continue current treatment  #6 DJD affecting multiple joints including shoulder will discuss with rheumatologist because there is associated inflammatory arthropathy as well    Subjective   Patient ID: Ludmila Chowdary is a 53 y.o. female who presents for Follow-up (Patient was in ER for neck and shoulder pain.  States that she has a burning sensation in her chest after eating.  ).    Past Surgical History:   Procedure Laterality Date    CHOLECYSTECTOMY  09/30/2016    Cholecystectomy    OTHER  SURGICAL HISTORY  12/18/2018    Gallbladder surgery      Family History   Problem Relation Name Age of Onset    Arthritis Mother      Hypertension Mother      Hypertension Father        Social History     Socioeconomic History    Marital status:      Spouse name: Not on file    Number of children: Not on file    Years of education: Not on file    Highest education level: Not on file   Occupational History    Not on file   Tobacco Use    Smoking status: Never    Smokeless tobacco: Never   Vaping Use    Vaping Use: Never used   Substance and Sexual Activity    Alcohol use: Never    Drug use: Never    Sexual activity: Not on file   Other Topics Concern    Not on file   Social History Narrative    Not on file     Social Determinants of Health     Financial Resource Strain: Not on file   Food Insecurity: Not on file   Transportation Needs: Not on file   Physical Activity: Not on file   Stress: Not on file   Social Connections: Not on file   Intimate Partner Violence: Not on file   Housing Stability: Not on file      Other and Pollen extracts   Current Outpatient Medications   Medication Sig Dispense Refill    amLODIPine (Norvasc) 10 mg tablet TAKE 1 TABLET BY MOUTH EVERY DAY FOR BLOOD PRESSURE 90 tablet 1    atenolol (Tenormin) 25 mg tablet TAKE 1 TABLET BY MOUTH EVERY DAY 90 tablet 1    atorvastatin (Lipitor) 10 mg tablet Take 1 tablet (10 mg) by mouth once daily.      bismuth subsalicylate (Pink Bismuth) 262 mg tablet Take 2 tablets (524 mg) by mouth once daily. 28 tablet 0    blood sugar diagnostic (True Metrix Glucose Test Strip) strip once daily.      ergocalciferol (Vitamin D-2) 1.25 MG (52776 UT) capsule Take by mouth.      lansoprazole (Prevacid) 30 mg DR capsule Take 1 capsule (30 mg) by mouth once daily. Do not crush or chew. 30 capsule 1    Lidocaine Pain Relief 4 % patch Place on the skin once daily as needed.      melatonin 5 mg capsule Take 1 capsule (5 mg) by mouth once daily at bedtime. 30 capsule  "0    pantoprazole (ProtoNix) 40 mg EC tablet Take 1 tablet (40 mg) by mouth once daily.      peg 400-propylene glycol (Systane Ultra) 0.4-0.3 % drops ophthalmic drops Administer 1 drop into both eyes 3 times a day. 10 mL 4    propylene glycoL 0.6 % drops Administer into affected eye(s).      famotidine (Pepcid) 20 mg tablet Take 1 tablet (20 mg) by mouth 2 times a day as needed for heartburn for up to 15 days. 30 tablet 0    guaiFENesin (Mucinex) 600 mg 12 hr tablet Take 1 tablet (600 mg) by mouth 2 times a day. Do not crush, chew, or split. 60 tablet 0    omeprazole (PriLOSEC) 40 mg DR capsule Take 1 capsule (40 mg) by mouth once daily in the morning. Take before meals for 14 days. Do not crush or chew. 14 capsule 0     No current facility-administered medications for this visit.      Vitals:    03/06/24 1350   BP: 131/84   BP Location: Left arm   Patient Position: Sitting   Pulse: 83   Temp: 36.2 °C (97.2 °F)   Weight: 74.7 kg (164 lb 9.6 oz)   Height: 1.499 m (4' 11\")      Problem List Items Addressed This Visit       DJD (degenerative joint disease)    Benign essential hypertension - Primary    Relevant Orders    Comprehensive Metabolic Panel    GERD (gastroesophageal reflux disease)    Relevant Medications    guaiFENesin (Mucinex) 600 mg 12 hr tablet    Hypercholesteremia    Relevant Orders    Comprehensive Metabolic Panel    Lipid Panel    Inflammatory polyarthropathy or polyarthritis (CMS/HCC)     Other Visit Diagnoses       Well adult health check        Relevant Orders    Comprehensive Metabolic Panel           Orders Placed This Encounter   Procedures    Comprehensive Metabolic Panel     Standing Status:   Future     Standing Expiration Date:   3/6/2025     Order Specific Question:   Release result to [employer]     Answer:   Immediate    Lipid Panel     Standing Status:   Future     Standing Expiration Date:   3/6/2025     Order Specific Question:   Release result to [employer]     Answer:   Immediate    " "    HPI  This is a 53-year-old female who is seen Dr. Rios the rheumatologist who was advised to see neuro surgeon but that has not completed for 1 year his only other  She also went to ER recently with a shoulder pain and neck pain she was also told that it is coming from the spine  She continues to have reflux symptoms and some sputum comes into the mouth and some postnasal drip wanted some medications we discussed the use of guaifenesin which is being prescribed    ROS  No chest pain or shortness of breath no nausea or vomiting no diarrhea no abdominal pain  H. pylori testing was reviewed and was negative and told to  Continues to have some reflux symptoms  Past medical history reviewed  Social and family history reviewed  Allergies and medications reviewed  Recent labs reviewed  Vital signs reviewed    PHYSICAL EXAM  Right shoulder movement causes discomfort neck movement causes some discomfort as well  Heart sounds regular chest clear abdomen is soft nontender neuro awake alert      No results found for: \"PR1\", \"BMPR1A\", \"CMPLAS\", \"GT8XIQIO\", \"KPSAT\"   Lab Results   Component Value Date    CHOL 158 08/16/2023    CHHDL 3.6 08/16/2023                "

## 2024-03-13 ENCOUNTER — LAB (OUTPATIENT)
Dept: LAB | Facility: LAB | Age: 54
End: 2024-03-13
Payer: COMMERCIAL

## 2024-03-13 DIAGNOSIS — E78.00 HYPERCHOLESTEREMIA: ICD-10-CM

## 2024-03-13 DIAGNOSIS — I10 BENIGN ESSENTIAL HYPERTENSION: ICD-10-CM

## 2024-03-13 DIAGNOSIS — Z00.00 WELL ADULT HEALTH CHECK: ICD-10-CM

## 2024-03-13 LAB
ALBUMIN SERPL BCP-MCNC: 4.1 G/DL (ref 3.4–5)
ALP SERPL-CCNC: 100 U/L (ref 33–110)
ALT SERPL W P-5'-P-CCNC: 19 U/L (ref 7–45)
ANION GAP SERPL CALC-SCNC: 12 MMOL/L (ref 10–20)
AST SERPL W P-5'-P-CCNC: 15 U/L (ref 9–39)
BILIRUB SERPL-MCNC: 0.5 MG/DL (ref 0–1.2)
BUN SERPL-MCNC: 17 MG/DL (ref 6–23)
CALCIUM SERPL-MCNC: 9.2 MG/DL (ref 8.6–10.3)
CHLORIDE SERPL-SCNC: 106 MMOL/L (ref 98–107)
CHOLEST SERPL-MCNC: 219 MG/DL (ref 0–199)
CHOLESTEROL/HDL RATIO: 4.6
CO2 SERPL-SCNC: 27 MMOL/L (ref 21–32)
CREAT SERPL-MCNC: 0.65 MG/DL (ref 0.5–1.05)
EGFRCR SERPLBLD CKD-EPI 2021: >90 ML/MIN/1.73M*2
GLUCOSE SERPL-MCNC: 91 MG/DL (ref 74–99)
HDLC SERPL-MCNC: 48.1 MG/DL
LDLC SERPL CALC-MCNC: 157 MG/DL
NON HDL CHOLESTEROL: 171 MG/DL (ref 0–149)
POTASSIUM SERPL-SCNC: 4.7 MMOL/L (ref 3.5–5.3)
PROT SERPL-MCNC: 6.9 G/DL (ref 6.4–8.2)
SODIUM SERPL-SCNC: 140 MMOL/L (ref 136–145)
TRIGL SERPL-MCNC: 68 MG/DL (ref 0–149)
VLDL: 14 MG/DL (ref 0–40)

## 2024-03-13 PROCEDURE — 36415 COLL VENOUS BLD VENIPUNCTURE: CPT

## 2024-03-13 PROCEDURE — 80053 COMPREHEN METABOLIC PANEL: CPT

## 2024-03-13 PROCEDURE — 80061 LIPID PANEL: CPT

## 2024-03-14 DIAGNOSIS — E78.00 PURE HYPERCHOLESTEROLEMIA: Primary | ICD-10-CM

## 2024-03-14 RX ORDER — ATORVASTATIN CALCIUM 10 MG/1
10 TABLET, FILM COATED ORAL DAILY
Qty: 90 TABLET | Refills: 1 | Status: SHIPPED | OUTPATIENT
Start: 2024-03-14

## 2024-03-14 NOTE — PROGRESS NOTES
T/c to pt, advised pt of the elevated cholesterol, Pt states she does not take the medication regularly as it is prescribed. I advised pt that this medication needs to be taken everyday as prescribed by Dr. Guerra. Pt states she needs a refill sent over to her local pharmacy.

## 2024-03-26 DIAGNOSIS — K21.9 GASTROESOPHAGEAL REFLUX DISEASE WITHOUT ESOPHAGITIS: ICD-10-CM

## 2024-03-26 DIAGNOSIS — R10.13 EPIGASTRIC DISCOMFORT: ICD-10-CM

## 2024-03-27 RX ORDER — LANSOPRAZOLE 30 MG/1
30 CAPSULE, DELAYED RELEASE ORAL DAILY
Qty: 30 CAPSULE | Refills: 1 | Status: SHIPPED | OUTPATIENT
Start: 2024-03-27 | End: 2024-03-29 | Stop reason: SDUPTHER

## 2024-03-29 DIAGNOSIS — K21.9 GASTROESOPHAGEAL REFLUX DISEASE WITHOUT ESOPHAGITIS: ICD-10-CM

## 2024-03-29 DIAGNOSIS — R10.13 EPIGASTRIC DISCOMFORT: ICD-10-CM

## 2024-03-29 RX ORDER — LANSOPRAZOLE 30 MG/1
30 CAPSULE, DELAYED RELEASE ORAL DAILY
Qty: 30 CAPSULE | Refills: 2 | Status: SHIPPED | OUTPATIENT
Start: 2024-03-29 | End: 2025-03-29

## 2024-04-23 NOTE — PROGRESS NOTES
"  Ludmila Chowdary is a 53 y.o. female with past medical history of HTN, hyperlipidemia, polyjoint arthritis on chronic NSAIDS, obesity, non-insulin-dependent diabetes, asthma, and H. Pylori who presents today for GERD. She has previously seen Keren TSANG, Dr. Migue Jerez, and Dr. Rodriguez and still having issues. She had EGD on 10/23/23 for epigastric pain with nausea/vomiting. This was normal but gastric biopsies showed presence of H. pylori. She was prescribed quadruple therapy but she's not sure how much of the medications she took since she developed a rash and diarrhea while on these medications.     Recent ER visit 2/15/2024 due to chest pains. EKG normal and troponin negative. Pepcid helped. Last saw Dr. Jerez 3/4/2024 for continued pain. H. Pylori breath test 3/1/2024 was normal. Lipase also normal. She was given lansoprazole (Prevacid) 30 mg once daily with *** relief.    She reports a *** history of ***. Associated with ***. Worse with ***. She has tried *** with *** improvement.     Denies nausea, vomiting, heartburn, early satiety, and bloating. There has been no change in bowels. He/She has *** bowel movements per day. No rectal bleeding, hematochezia, or melena. No unintentional weight loss.     States she has been taking NSAIDs for \"years\" secondary to her arthritic pain.      No prior EGD or colonoscopy.    Social history: -    Family history: Denies family history of colon cancer or other GI disorders or malignancy.     Past Medical History:   Diagnosis Date    Hypertension     Other specified bacterial intestinal infections 09/30/2016    Positive H. pylori test     Past Surgical History:   Procedure Laterality Date    CHOLECYSTECTOMY  09/30/2016    Cholecystectomy    OTHER SURGICAL HISTORY  12/18/2018    Gallbladder surgery     Current Outpatient Medications   Medication Sig Dispense Refill    amLODIPine (Norvasc) 10 mg tablet TAKE 1 TABLET BY MOUTH EVERY DAY FOR BLOOD PRESSURE 90 tablet 1    " atenolol (Tenormin) 25 mg tablet TAKE 1 TABLET BY MOUTH EVERY DAY 90 tablet 1    atorvastatin (Lipitor) 10 mg tablet Take 1 tablet (10 mg) by mouth once daily. 90 tablet 1    bismuth subsalicylate (Pink Bismuth) 262 mg tablet Take 2 tablets (524 mg) by mouth once daily. 28 tablet 0    blood sugar diagnostic (True Metrix Glucose Test Strip) strip once daily.      ergocalciferol (Vitamin D-2) 1.25 MG (98538 UT) capsule Take by mouth.      famotidine (Pepcid) 20 mg tablet Take 1 tablet (20 mg) by mouth 2 times a day as needed for heartburn for up to 15 days. 30 tablet 0    guaiFENesin (Mucinex) 600 mg 12 hr tablet Take 1 tablet (600 mg) by mouth 2 times a day. Do not crush, chew, or split. 60 tablet 0    lansoprazole (Prevacid) 30 mg DR capsule Take 1 capsule (30 mg) by mouth once daily. Do not crush or chew. 30 capsule 2    Lidocaine Pain Relief 4 % patch Place on the skin once daily as needed.      omeprazole (PriLOSEC) 40 mg DR capsule Take 1 capsule (40 mg) by mouth once daily in the morning. Take before meals for 14 days. Do not crush or chew. 14 capsule 0    pantoprazole (ProtoNix) 40 mg EC tablet Take 1 tablet (40 mg) by mouth once daily.      peg 400-propylene glycol (Systane Ultra) 0.4-0.3 % drops ophthalmic drops Administer 1 drop into both eyes 3 times a day. 10 mL 4    propylene glycoL 0.6 % drops Administer into affected eye(s).       No current facility-administered medications for this visit.     Allergies   Allergen Reactions    Other Shortness of breath     SWEETS & PERFUMES    Pollen Extracts Itching     Runny nose  - sneezing       Family History   Problem Relation Name Age of Onset    Arthritis Mother      Hypertension Mother      Hypertension Father         Review of Systems  Review of Systems negative except as noted in HPI.    Objective     There were no vitals taken for this visit.     Physical Exam  Constitutional:  No acute distress. Normal appearance. Not ill-appearing.  HENT:  Head  normocephalic and atraumatic. Conjunctivae normal.  Cardiovascular:  Normal rate. Regular rhythm.  Pulmonary:  Pulmonary effort normal. No respiratory distress. Breath sounds clear.  Abdominal:  Abdomen is flat and soft. There is no distension. No tenderness or guarding.  Skin: Dry.  Neurological:  Alert and oriented.  Psychiatric:  Mood and affect normal.    Assessment/Plan     53 y.o. female with history of *** who presents today for clinic visit for *** history of ***.    Recommendations  1.  2. Follow up    Electronically signed by: Keri Wood CNP on 4/23/2024 at 1:58 PM

## 2024-04-26 ENCOUNTER — APPOINTMENT (OUTPATIENT)
Dept: GASTROENTEROLOGY | Facility: CLINIC | Age: 54
End: 2024-04-26
Payer: COMMERCIAL

## 2024-04-30 ENCOUNTER — OFFICE VISIT (OUTPATIENT)
Dept: PRIMARY CARE | Facility: CLINIC | Age: 54
End: 2024-04-30
Payer: COMMERCIAL

## 2024-04-30 VITALS
HEART RATE: 84 BPM | TEMPERATURE: 97.2 F | HEIGHT: 59 IN | WEIGHT: 164.6 LBS | DIASTOLIC BLOOD PRESSURE: 81 MMHG | BODY MASS INDEX: 33.18 KG/M2 | SYSTOLIC BLOOD PRESSURE: 125 MMHG

## 2024-04-30 DIAGNOSIS — M26.622 ARTHRALGIA OF LEFT TEMPOROMANDIBULAR JOINT: ICD-10-CM

## 2024-04-30 DIAGNOSIS — R68.84 PAIN IN LOWER JAW: Primary | ICD-10-CM

## 2024-04-30 DIAGNOSIS — E78.5 HYPERLIPIDEMIA, UNSPECIFIED HYPERLIPIDEMIA TYPE: ICD-10-CM

## 2024-04-30 DIAGNOSIS — K21.9 GASTROESOPHAGEAL REFLUX DISEASE WITHOUT ESOPHAGITIS: ICD-10-CM

## 2024-04-30 DIAGNOSIS — I10 BENIGN ESSENTIAL HYPERTENSION: ICD-10-CM

## 2024-04-30 PROCEDURE — 1036F TOBACCO NON-USER: CPT | Performed by: INTERNAL MEDICINE

## 2024-04-30 PROCEDURE — 3050F LDL-C >= 130 MG/DL: CPT | Performed by: INTERNAL MEDICINE

## 2024-04-30 PROCEDURE — 3074F SYST BP LT 130 MM HG: CPT | Performed by: INTERNAL MEDICINE

## 2024-04-30 PROCEDURE — 99214 OFFICE O/P EST MOD 30 MIN: CPT | Performed by: INTERNAL MEDICINE

## 2024-04-30 PROCEDURE — 3079F DIAST BP 80-89 MM HG: CPT | Performed by: INTERNAL MEDICINE

## 2024-04-30 RX ORDER — IBUPROFEN 600 MG/1
600 TABLET ORAL EVERY 6 HOURS PRN
COMMUNITY
Start: 2024-04-17

## 2024-04-30 RX ORDER — CIPROFLOXACIN 250 MG/1
250 TABLET, FILM COATED ORAL 2 TIMES DAILY
Qty: 14 TABLET | Refills: 0 | Status: SHIPPED | OUTPATIENT
Start: 2024-04-30 | End: 2024-05-07

## 2024-04-30 RX ORDER — METRONIDAZOLE 250 MG/1
250 TABLET ORAL 3 TIMES DAILY
Qty: 21 TABLET | Refills: 0 | Status: SHIPPED | OUTPATIENT
Start: 2024-04-30 | End: 2024-05-07

## 2024-04-30 NOTE — PROGRESS NOTES
Assessment/Plan   Problem List Items Addressed This Visit       Benign essential hypertension    GERD (gastroesophageal reflux disease)    Hyperlipidemia    Arthralgia of left temporomandibular joint - Primary    Relevant Medications    ciprofloxacin (Cipro) 250 mg tablet    metroNIDAZOLE (Flagyl) 250 mg tablet   Infection deep inside the mandible or sinus infection could be a factor in this left-sided facial pain and swelling antibiotic is being prescribed  She has been advised to seek opinion from the dentist and x-ray should be done to see any deep pockets and if the further instrumentation or exploration is needed  She has been told that the CAT scan of the head will be done if the testing at dentist is negative and resolution with antibiotic is not achieved  Her hypertension is under control  GERD is stable  She is on Lipitor for atorvastatin  This medication to continue  Advised to follow-up in 3 weeks    Subjective   Patient ID: Ludmila Chowdary is a 53 y.o. female who presents for Follow-up (Patient has left side pain in face.  States that she has had a tooth pulled 2 weeks ago.  Did go to Urgent care for pain and received a shot for pain.  ).    Past Surgical History:   Procedure Laterality Date    CHOLECYSTECTOMY  09/30/2016    Cholecystectomy    OTHER SURGICAL HISTORY  12/18/2018    Gallbladder surgery      Family History   Problem Relation Name Age of Onset    Arthritis Mother      Hypertension Mother      Hypertension Father        Social History     Socioeconomic History    Marital status:      Spouse name: Not on file    Number of children: Not on file    Years of education: Not on file    Highest education level: Not on file   Occupational History    Not on file   Tobacco Use    Smoking status: Never    Smokeless tobacco: Never   Vaping Use    Vaping status: Never Used   Substance and Sexual Activity    Alcohol use: Never    Drug use: Never    Sexual activity: Not on file   Other Topics Concern     Not on file   Social History Narrative    Not on file     Social Determinants of Health     Financial Resource Strain: Not on file   Food Insecurity: Not on file   Transportation Needs: Not on file   Physical Activity: Not on file   Stress: Not on file   Social Connections: Not on file   Intimate Partner Violence: Not on file   Housing Stability: Not on file      Other and Pollen extracts   Current Outpatient Medications   Medication Sig Dispense Refill    amLODIPine (Norvasc) 10 mg tablet TAKE 1 TABLET BY MOUTH EVERY DAY FOR BLOOD PRESSURE 90 tablet 1    atenolol (Tenormin) 25 mg tablet TAKE 1 TABLET BY MOUTH EVERY DAY 90 tablet 1    atorvastatin (Lipitor) 10 mg tablet Take 1 tablet (10 mg) by mouth once daily. 90 tablet 1    bismuth subsalicylate (Pink Bismuth) 262 mg tablet Take 2 tablets (524 mg) by mouth once daily. 28 tablet 0    blood sugar diagnostic (True Metrix Glucose Test Strip) strip once daily.      ergocalciferol (Vitamin D-2) 1.25 MG (20154 UT) capsule Take by mouth.      guaiFENesin (Mucinex) 600 mg 12 hr tablet Take 1 tablet (600 mg) by mouth 2 times a day. Do not crush, chew, or split. 60 tablet 0    ibuprofen 600 mg tablet Take 1 tablet (600 mg) by mouth every 6 hours if needed for moderate pain (4 - 6).      lansoprazole (Prevacid) 30 mg DR capsule Take 1 capsule (30 mg) by mouth once daily. Do not crush or chew. 30 capsule 2    Lidocaine Pain Relief 4 % patch Place on the skin once daily as needed.      pantoprazole (ProtoNix) 40 mg EC tablet Take 1 tablet (40 mg) by mouth once daily.      peg 400-propylene glycol (Systane Ultra) 0.4-0.3 % drops ophthalmic drops Administer 1 drop into both eyes 3 times a day. 10 mL 4    propylene glycoL 0.6 % drops Administer into affected eye(s).      ciprofloxacin (Cipro) 250 mg tablet Take 1 tablet (250 mg) by mouth 2 times a day for 7 days. 14 tablet 0    famotidine (Pepcid) 20 mg tablet Take 1 tablet (20 mg) by mouth 2 times a day as needed for heartburn  "for up to 15 days. 30 tablet 0    metroNIDAZOLE (Flagyl) 250 mg tablet Take 1 tablet (250 mg) by mouth 3 times a day for 7 days. 21 tablet 0    omeprazole (PriLOSEC) 40 mg DR capsule Take 1 capsule (40 mg) by mouth once daily in the morning. Take before meals for 14 days. Do not crush or chew. 14 capsule 0     No current facility-administered medications for this visit.      Vitals:    04/30/24 1212   BP: 125/81   BP Location: Left arm   Patient Position: Sitting   Pulse: 84   Temp: 36.2 °C (97.2 °F)   Weight: 74.7 kg (164 lb 9.6 oz)   Height: 1.499 m (4' 11\")      Problem List Items Addressed This Visit       Benign essential hypertension    GERD (gastroesophageal reflux disease)    Hyperlipidemia    Arthralgia of left temporomandibular joint - Primary    Relevant Medications    ciprofloxacin (Cipro) 250 mg tablet    metroNIDAZOLE (Flagyl) 250 mg tablet      No orders of the defined types were placed in this encounter.       HPI  She comes with the hope that I will fix her problem of left jaw pain and the left cheek and left face pain that she is having  She had tooth extraction 2 weeks ago and since that time this pain is there  She was given antibiotic amoxicillin but has resulted in some improvement but not completely  She has been having pain in the left temple area left TMJ area and the left jaw area at the angle of the jaw  She feels it was swollen and now it is less  She has no fever  She could not be seen by the dentist and suggested that she should be seen by the 1 who did tooth extraction  ROS  Otherwise has been negative  Past medical history reviewed  Social and family history reviewed  Allergies and medications reviewed  Recent labs reviewed  Vital signs reviewed    PHYSICAL EXAM  Examination of the mouth shows loss of last molar on the left side which was extracted  There was no obvious gingivitis  But there was tenderness in the angle of the jaw and also in the temporomandibular joint area  Other " "examination is normal      No results found for: \"PR1\", \"BMPR1A\", \"CMPLAS\", \"JD9HGECD\", \"KPSAT\"   Lab Results   Component Value Date    CHOL 219 (H) 03/13/2024    LDLCALC 157 (H) 03/13/2024    CHHDL 4.6 03/13/2024                "

## 2024-05-01 ENCOUNTER — OFFICE VISIT (OUTPATIENT)
Dept: GASTROENTEROLOGY | Facility: CLINIC | Age: 54
End: 2024-05-01
Payer: COMMERCIAL

## 2024-05-01 VITALS
HEART RATE: 81 BPM | DIASTOLIC BLOOD PRESSURE: 82 MMHG | SYSTOLIC BLOOD PRESSURE: 128 MMHG | HEIGHT: 60 IN | TEMPERATURE: 98.2 F | BODY MASS INDEX: 32.39 KG/M2 | WEIGHT: 165 LBS

## 2024-05-01 DIAGNOSIS — K21.9 GASTROESOPHAGEAL REFLUX DISEASE WITHOUT ESOPHAGITIS: ICD-10-CM

## 2024-05-01 DIAGNOSIS — R11.10 REGURGITATION OF FOOD: Primary | ICD-10-CM

## 2024-05-01 DIAGNOSIS — R11.11 VOMITING WITHOUT NAUSEA, UNSPECIFIED VOMITING TYPE: ICD-10-CM

## 2024-05-01 PROCEDURE — 3074F SYST BP LT 130 MM HG: CPT | Performed by: INTERNAL MEDICINE

## 2024-05-01 PROCEDURE — 99214 OFFICE O/P EST MOD 30 MIN: CPT | Performed by: INTERNAL MEDICINE

## 2024-05-01 PROCEDURE — 1036F TOBACCO NON-USER: CPT | Performed by: INTERNAL MEDICINE

## 2024-05-01 PROCEDURE — 3050F LDL-C >= 130 MG/DL: CPT | Performed by: INTERNAL MEDICINE

## 2024-05-01 PROCEDURE — 3079F DIAST BP 80-89 MM HG: CPT | Performed by: INTERNAL MEDICINE

## 2024-05-01 RX ORDER — METOCLOPRAMIDE 5 MG/1
5 TABLET ORAL
Qty: 90 TABLET | Refills: 0 | Status: SHIPPED | OUTPATIENT
Start: 2024-05-01 | End: 2024-05-30 | Stop reason: WASHOUT

## 2024-05-01 ASSESSMENT — ENCOUNTER SYMPTOMS
UNEXPECTED WEIGHT CHANGE: 0
WEAKNESS: 0
CHILLS: 0
ARTHRALGIAS: 0
FEVER: 0
ROS GI COMMENTS: SEE HPI
EYE PAIN: 0
SHORTNESS OF BREATH: 0

## 2024-05-01 NOTE — PATIENT INSTRUCTIONS
Schedule patient for esophageal manometry and pH study to evaluate for esophageal dysmotility and GERD.  Patient will need to hold Prevacid for 5 days before this study.  Prescribe a trial of Reglan 5 mg to take 20 minutes before each meals.  Follow-up in 3 months.

## 2024-05-01 NOTE — PROGRESS NOTES
Subjective     History of Present Illness:   Ludmila Chowdary is a 53 y.o. female with PMH of hypertension, hyperlipidemia, diabetes mellitus, H. pylori, rheumatoid arthritis, s/p cholecystectomy, and GERD who presented to GI clinic for follow-up.  Her PPI was recently switched to Prevacid but she has not noticed significant improvement.  Patient continues to have significant regurgitation and vomiting after food intake.  This can occur 5 minutes to an hour after food intake.  She reports having heartburn and increased burping.  Patient denies having abdominal pain or chronic nausea.  Patient has normal bowel movements.  Her symptoms improve when she was fasting during Ramadan.    GI History:   Patient initially saw Malorie Alfaro in GI office with complaints of epigastric pain and nausea/vomiting.  EGD on 10/23/23 was normal but gastric biopsies showed presence of H. pylori. She was prescribed quadruple therapy but she's not sure how much of the medications she took since she developed a rash and diarrhea while on these medications.  She returned to GI office in March with complaints of persistent heartburn, epigastric discomfort, and nausea/vomiting.  She takes pantoprazole daily and famotidine as needed but this has not helped. Repeat H. pylori breath test was negative.       Review of Systems  Review of Systems   Constitutional:  Negative for chills, fever and unexpected weight change.   HENT:  Negative for mouth sores.    Eyes:  Negative for pain.   Respiratory:  Negative for shortness of breath.    Cardiovascular:  Negative for chest pain.   Gastrointestinal:         See HPI   Musculoskeletal:  Negative for arthralgias.   Skin:  Negative for rash.   Neurological:  Negative for weakness.       Past Medical History   has a past medical history of Hypertension and Other specified bacterial intestinal infections (09/30/2016).     Social History   reports that she has never smoked. She has never used smokeless tobacco. She  reports that she does not drink alcohol and does not use drugs.     Family History  family history includes Arthritis in her mother; Hypertension in her father and mother.     Allergies  Allergies   Allergen Reactions    Other Shortness of breath     SWEETS & PERFUMES    Pollen Extracts Itching     Runny nose  - sneezing       Medications  Current Outpatient Medications   Medication Instructions    amLODIPine (NORVASC) 10 mg, oral, Daily, for blood pressure    atenolol (TENORMIN) 25 mg, oral, Daily    atorvastatin (LIPITOR) 10 mg, oral, Daily    bismuth subsalicylate (PINK BISMUTH) 524 mg, oral, Daily    blood sugar diagnostic (True Metrix Glucose Test Strip) strip Daily    ciprofloxacin (CIPRO) 250 mg, oral, 2 times daily    ergocalciferol (Vitamin D-2) 1.25 MG (81688 UT) capsule oral    famotidine (PEPCID) 20 mg, oral, 2 times daily PRN    guaiFENesin (MUCINEX) 600 mg, oral, 2 times daily, Do not crush, chew, or split.    ibuprofen 600 mg, oral, Every 6 hours PRN    lansoprazole (PREVACID) 30 mg, oral, Daily, Do not crush or chew.    Lidocaine Pain Relief 4 % patch transdermal, Daily PRN    metroNIDAZOLE (FLAGYL) 250 mg, oral, 3 times daily    omeprazole (PRILOSEC) 40 mg, oral, Daily before breakfast, Do not crush or chew.    pantoprazole (PROTONIX) 40 mg, oral, Daily    peg 400-propylene glycol (Systane Ultra) 0.4-0.3 % drops ophthalmic drops 1 drop, Both Eyes, 3 times daily    propylene glycoL 0.6 % drops ophthalmic (eye)        Objective   Visit Vitals  /82 (BP Location: Right arm, Patient Position: Sitting, BP Cuff Size: Large adult)   Pulse 81   Temp 36.8 °C (98.2 °F)      Body mass index is 32.22 kg/m².  Physical Exam  Constitutional:       General: She is not in acute distress.     Appearance: Normal appearance.   HENT:      Head: Normocephalic and atraumatic.   Eyes:      Extraocular Movements: Extraocular movements intact.   Cardiovascular:      Rate and Rhythm: Normal rate and regular rhythm.       "Heart sounds: Normal heart sounds. No murmur heard.  Pulmonary:      Effort: Pulmonary effort is normal.      Breath sounds: Normal breath sounds.   Abdominal:      General: Bowel sounds are normal. There is no distension.      Palpations: Abdomen is soft.      Tenderness: There is abdominal tenderness. There is no guarding or rebound.      Comments: Mild epigastric tenderness to palpation   Musculoskeletal:         General: No swelling or tenderness. Normal range of motion.      Cervical back: Normal range of motion and neck supple.   Skin:     General: Skin is warm.      Coloration: Skin is not jaundiced.   Neurological:      General: No focal deficit present.      Mental Status: She is alert and oriented to person, place, and time.   Psychiatric:         Mood and Affect: Mood normal.         Behavior: Behavior normal.       Labs  Lab Results   Component Value Date    WBC 5.6 02/15/2024    HGB 12.7 02/15/2024    HCT 40.0 02/15/2024    MCV 82 02/15/2024     02/15/2024     Lab Results   Component Value Date    GLUCOSE 91 03/13/2024    CALCIUM 9.2 03/13/2024     03/13/2024    K 4.7 03/13/2024    CO2 27 03/13/2024     03/13/2024    BUN 17 03/13/2024    CREATININE 0.65 03/13/2024     Lab Results   Component Value Date    ALT 19 03/13/2024    AST 15 03/13/2024    ALKPHOS 100 03/13/2024    BILITOT 0.5 03/13/2024     No results found for: \"INR\"  Lab Results   Component Value Date    CRP 0.52 08/26/2019     No results found for: \"CALPS\"    Assessment/Plan   Ludmila Chowdary is a 53 y.o. female with PMH of hypertension, hyperlipidemia, diabetes mellitus, H. pylori, rheumatoid arthritis, s/p cholecystectomy, and GERD who presented to GI clinic for follow-up. Recent EGD was unremarkable besides H. Pylori which has been treated and eradication confirmed.  However, despite PPI therapy, patient continues to reports symptoms of regurgitation, heartburn, and vomiting after 5 minutes to an hour of food intake.  This " may suggest an esophageal or gastric dysmotility problem.  We will schedule for an esophageal manometry and pH study.  Trial of Reglan 5 mg three times daily before meals.  Follow-up in 3 months.    Patient Instructions  Schedule patient for manometry and pH study to evaluate for esophageal dysmotility.  Patient will need to hold Prevacid for 5 days before this study.  Prescribe a trial of reglan 5 mg to take 20 minutes before each meals.  Follow-up in 3 months.    Migue Jerez MD

## 2024-05-21 ENCOUNTER — APPOINTMENT (OUTPATIENT)
Dept: PRIMARY CARE | Facility: CLINIC | Age: 54
End: 2024-05-21
Payer: COMMERCIAL

## 2024-05-30 ENCOUNTER — OFFICE VISIT (OUTPATIENT)
Dept: PRIMARY CARE | Facility: CLINIC | Age: 54
End: 2024-05-30
Payer: COMMERCIAL

## 2024-05-30 ENCOUNTER — APPOINTMENT (OUTPATIENT)
Dept: PRIMARY CARE | Facility: CLINIC | Age: 54
End: 2024-05-30
Payer: COMMERCIAL

## 2024-05-30 VITALS
HEART RATE: 80 BPM | WEIGHT: 164.8 LBS | TEMPERATURE: 97.5 F | HEIGHT: 60 IN | BODY MASS INDEX: 32.35 KG/M2 | DIASTOLIC BLOOD PRESSURE: 84 MMHG | SYSTOLIC BLOOD PRESSURE: 139 MMHG

## 2024-05-30 DIAGNOSIS — L30.9 ECZEMA, UNSPECIFIED TYPE: ICD-10-CM

## 2024-05-30 DIAGNOSIS — R68.84 JAW PAIN: Primary | ICD-10-CM

## 2024-05-30 PROCEDURE — 3075F SYST BP GE 130 - 139MM HG: CPT | Performed by: INTERNAL MEDICINE

## 2024-05-30 PROCEDURE — 3050F LDL-C >= 130 MG/DL: CPT | Performed by: INTERNAL MEDICINE

## 2024-05-30 PROCEDURE — 3079F DIAST BP 80-89 MM HG: CPT | Performed by: INTERNAL MEDICINE

## 2024-05-30 PROCEDURE — 1036F TOBACCO NON-USER: CPT | Performed by: INTERNAL MEDICINE

## 2024-05-30 PROCEDURE — 99213 OFFICE O/P EST LOW 20 MIN: CPT | Performed by: INTERNAL MEDICINE

## 2024-05-30 RX ORDER — TRIAMCINOLONE ACETONIDE 1 MG/G
CREAM TOPICAL 2 TIMES DAILY
Qty: 15 G | Refills: 0 | Status: SHIPPED | OUTPATIENT
Start: 2024-05-30

## 2024-05-30 ASSESSMENT — PATIENT HEALTH QUESTIONNAIRE - PHQ9
1. LITTLE INTEREST OR PLEASURE IN DOING THINGS: NOT AT ALL
2. FEELING DOWN, DEPRESSED OR HOPELESS: NOT AT ALL
SUM OF ALL RESPONSES TO PHQ9 QUESTIONS 1 AND 2: 0

## 2024-05-30 NOTE — PROGRESS NOTES
Assessment/Plan   Problem List Items Addressed This Visit       Jaw pain - Primary    Relevant Orders    Referral to Oral Maxillofacial Surgery     Other Visit Diagnoses       Eczema, unspecified type        Relevant Medications    triamcinolone (Kenalog) 0.1 % cream        For jaw pain and abnormality that was noted by the dentist she could see oral and maxillofacial surgery and there are plenty in the  system that they could see her  Further imaging studies and assessment is required  For eczema of the lower extremity and his skin triamcinolone local cream to apply twice a day for up to 10 days advised    Subjective   Patient ID: Ludmila Chowdary is a 53 y.o. female who presents for Follow-up (Patient c/o pain in right shoulder and hand. ).    Past Surgical History:   Procedure Laterality Date    CHOLECYSTECTOMY  09/30/2016    Cholecystectomy    OTHER SURGICAL HISTORY  12/18/2018    Gallbladder surgery      Family History   Problem Relation Name Age of Onset    Arthritis Mother      Hypertension Mother      Hypertension Father        Social History     Socioeconomic History    Marital status:      Spouse name: Not on file    Number of children: Not on file    Years of education: Not on file    Highest education level: Not on file   Occupational History    Not on file   Tobacco Use    Smoking status: Never    Smokeless tobacco: Never   Vaping Use    Vaping status: Never Used   Substance and Sexual Activity    Alcohol use: Never    Drug use: Never    Sexual activity: Not on file   Other Topics Concern    Not on file   Social History Narrative    Not on file     Social Determinants of Health     Financial Resource Strain: Not on file   Food Insecurity: Not on file   Transportation Needs: Not on file   Physical Activity: Not on file   Stress: Not on file   Social Connections: Not on file   Intimate Partner Violence: Not on file   Housing Stability: Not on file      Other and Pollen extracts   Current Outpatient  Medications   Medication Sig Dispense Refill    amLODIPine (Norvasc) 10 mg tablet TAKE 1 TABLET BY MOUTH EVERY DAY FOR BLOOD PRESSURE 90 tablet 1    atenolol (Tenormin) 25 mg tablet TAKE 1 TABLET BY MOUTH EVERY DAY 90 tablet 1    atorvastatin (Lipitor) 10 mg tablet Take 1 tablet (10 mg) by mouth once daily. 90 tablet 1    bismuth subsalicylate (Pink Bismuth) 262 mg tablet Take 2 tablets (524 mg) by mouth once daily. 28 tablet 0    blood sugar diagnostic (True Metrix Glucose Test Strip) strip once daily.      ergocalciferol (Vitamin D-2) 1.25 MG (84123 UT) capsule Take by mouth.      guaiFENesin (Mucinex) 600 mg 12 hr tablet Take 1 tablet (600 mg) by mouth 2 times a day. Do not crush, chew, or split. 60 tablet 0    ibuprofen 600 mg tablet Take 1 tablet (600 mg) by mouth every 6 hours if needed for moderate pain (4 - 6).      lansoprazole (Prevacid) 30 mg DR capsule Take 1 capsule (30 mg) by mouth once daily. Do not crush or chew. 30 capsule 2    Lidocaine Pain Relief 4 % patch Place on the skin once daily as needed.      peg 400-propylene glycol (Systane Ultra) 0.4-0.3 % drops ophthalmic drops Administer 1 drop into both eyes 3 times a day. 10 mL 4    propylene glycoL 0.6 % drops Administer into affected eye(s).      famotidine (Pepcid) 20 mg tablet Take 1 tablet (20 mg) by mouth 2 times a day as needed for heartburn for up to 15 days. 30 tablet 0    triamcinolone (Kenalog) 0.1 % cream Apply topically 2 times a day. Apply to affected area 1-2 times daily as needed. 15 g 0     No current facility-administered medications for this visit.      Vitals:    05/30/24 1701   BP: 139/84   BP Location: Left arm   Patient Position: Sitting   Pulse: 80   Temp: 36.4 °C (97.5 °F)   Weight: 74.8 kg (164 lb 12.8 oz)   Height: 1.524 m (5')      Problem List Items Addressed This Visit       Jaw pain - Primary    Relevant Orders    Referral to Oral Maxillofacial Surgery     Other Visit Diagnoses       Eczema, unspecified type         "Relevant Medications    triamcinolone (Kenalog) 0.1 % cream           Orders Placed This Encounter   Procedures    Referral to Oral Maxillofacial Surgery     Standing Status:   Future     Standing Expiration Date:   5/30/2025     Referral Priority:   Routine     Referral Type:   Consultation     Referral Reason:   Specialty Services Required     Requested Specialty:   Oral Surgery     Number of Visits Requested:   1        HPI  This is a 53-year-old female who came to discuss issues associated with tooth extraction  She said she has pain on the left side though it is much improved than before but nevertheless she was advised by her usual dentist to go to [facility] because certain things they could not do  When [facility] she was told that she will not be able to take care because they are fully booked for a long.  And therefore she did not have an appointment  The note that she has got shows that there may be translucency in the lower jaw after the extraction of the tooth and supposedly may be possibly a fracture in that area    ROS she has aches and pains and she follows Dr. Rios as well  Past medical history reviewed  Social and family history reviewed  Allergies and medications reviewed  Recent labs reviewed  Vital signs reviewed    PHYSICAL EXAM  Examination wise there may be mild tenderness in the TMJ area on the left side  She is otherwise awake alert and in no distress  She also has eczematous dermatitis of lower extremity that she showed to me and is patch area      No results found for: \"PR1\", \"BMPR1A\", \"CMPLAS\", \"CK7VJEIM\", \"KPSAT\"   Lab Results   Component Value Date    CHOL 219 (H) 03/13/2024    LDLCALC 157 (H) 03/13/2024    CHHDL 4.6 03/13/2024                  "

## 2024-06-24 ENCOUNTER — APPOINTMENT (OUTPATIENT)
Dept: GASTROENTEROLOGY | Facility: HOSPITAL | Age: 54
End: 2024-06-24
Payer: COMMERCIAL

## 2024-07-10 ENCOUNTER — APPOINTMENT (OUTPATIENT)
Dept: GASTROENTEROLOGY | Facility: CLINIC | Age: 54
End: 2024-07-10
Payer: COMMERCIAL

## 2024-07-22 ENCOUNTER — HOSPITAL ENCOUNTER (OUTPATIENT)
Dept: GASTROENTEROLOGY | Facility: HOSPITAL | Age: 54
Discharge: HOME | End: 2024-07-22
Payer: COMMERCIAL

## 2024-07-22 VITALS
SYSTOLIC BLOOD PRESSURE: 133 MMHG | DIASTOLIC BLOOD PRESSURE: 80 MMHG | RESPIRATION RATE: 16 BRPM | OXYGEN SATURATION: 99 % | HEART RATE: 83 BPM

## 2024-07-22 DIAGNOSIS — K21.9 GASTROESOPHAGEAL REFLUX DISEASE WITHOUT ESOPHAGITIS: ICD-10-CM

## 2024-07-22 DIAGNOSIS — R11.10 REGURGITATION OF FOOD: ICD-10-CM

## 2024-07-22 PROCEDURE — 2720000007 HC OR 272 NO HCPCS

## 2024-07-22 RX ORDER — LIDOCAINE HYDROCHLORIDE 20 MG/ML
1 JELLY TOPICAL ONCE
OUTPATIENT
Start: 2024-07-22

## 2024-07-22 RX ORDER — PANTOPRAZOLE SODIUM 40 MG/1
40 TABLET, DELAYED RELEASE ORAL
COMMUNITY

## 2024-07-22 RX ORDER — SUCRALFATE 1 G/1
1 TABLET ORAL
COMMUNITY

## 2024-07-22 RX ORDER — METOCLOPRAMIDE 5 MG/1
5 TABLET ORAL 4 TIMES DAILY
COMMUNITY

## 2024-08-05 DIAGNOSIS — L30.9 ECZEMA, UNSPECIFIED TYPE: ICD-10-CM

## 2024-08-08 RX ORDER — TRIAMCINOLONE ACETONIDE 1 MG/G
CREAM TOPICAL 2 TIMES DAILY
Qty: 15 G | Refills: 0 | Status: SHIPPED | OUTPATIENT
Start: 2024-08-08

## 2024-09-05 DIAGNOSIS — I10 ESSENTIAL (PRIMARY) HYPERTENSION: ICD-10-CM

## 2024-09-09 RX ORDER — AMLODIPINE BESYLATE 10 MG/1
10 TABLET ORAL DAILY
Qty: 90 TABLET | Refills: 0 | Status: SHIPPED | OUTPATIENT
Start: 2024-09-09

## 2024-09-10 ENCOUNTER — APPOINTMENT (OUTPATIENT)
Dept: RADIOLOGY | Facility: HOSPITAL | Age: 54
End: 2024-09-10
Payer: COMMERCIAL

## 2024-09-10 ENCOUNTER — HOSPITAL ENCOUNTER (EMERGENCY)
Facility: HOSPITAL | Age: 54
Discharge: HOME | End: 2024-09-10
Attending: EMERGENCY MEDICINE
Payer: COMMERCIAL

## 2024-09-10 VITALS
TEMPERATURE: 97.9 F | DIASTOLIC BLOOD PRESSURE: 101 MMHG | HEIGHT: 60 IN | WEIGHT: 164 LBS | RESPIRATION RATE: 17 BRPM | HEART RATE: 74 BPM | OXYGEN SATURATION: 99 % | SYSTOLIC BLOOD PRESSURE: 179 MMHG | BODY MASS INDEX: 32.2 KG/M2

## 2024-09-10 DIAGNOSIS — J01.00 ACUTE MAXILLARY SINUSITIS, RECURRENCE NOT SPECIFIED: Primary | ICD-10-CM

## 2024-09-10 DIAGNOSIS — I10 HYPERTENSION, UNSPECIFIED TYPE: ICD-10-CM

## 2024-09-10 LAB
ALBUMIN SERPL BCP-MCNC: 4.5 G/DL (ref 3.4–5)
ALP SERPL-CCNC: 97 U/L (ref 33–110)
ALT SERPL W P-5'-P-CCNC: 18 U/L (ref 7–45)
ANION GAP SERPL CALC-SCNC: 12 MMOL/L (ref 10–20)
AST SERPL W P-5'-P-CCNC: 15 U/L (ref 9–39)
BASOPHILS # BLD AUTO: 0.03 X10*3/UL (ref 0–0.1)
BASOPHILS NFR BLD AUTO: 0.4 %
BILIRUB SERPL-MCNC: 0.2 MG/DL (ref 0–1.2)
BNP SERPL-MCNC: 59 PG/ML (ref 0–99)
BUN SERPL-MCNC: 21 MG/DL (ref 6–23)
CALCIUM SERPL-MCNC: 9.8 MG/DL (ref 8.6–10.3)
CARDIAC TROPONIN I PNL SERPL HS: <3 NG/L (ref 0–13)
CHLORIDE SERPL-SCNC: 103 MMOL/L (ref 98–107)
CO2 SERPL-SCNC: 28 MMOL/L (ref 21–32)
CREAT SERPL-MCNC: 0.72 MG/DL (ref 0.5–1.05)
EGFRCR SERPLBLD CKD-EPI 2021: >90 ML/MIN/1.73M*2
EOSINOPHIL # BLD AUTO: 0.13 X10*3/UL (ref 0–0.7)
EOSINOPHIL NFR BLD AUTO: 1.8 %
ERYTHROCYTE [DISTWIDTH] IN BLOOD BY AUTOMATED COUNT: 14.9 % (ref 11.5–14.5)
GLUCOSE SERPL-MCNC: 94 MG/DL (ref 74–99)
HCT VFR BLD AUTO: 41.9 % (ref 36–46)
HGB BLD-MCNC: 13.4 G/DL (ref 12–16)
IMM GRANULOCYTES # BLD AUTO: 0.01 X10*3/UL (ref 0–0.7)
IMM GRANULOCYTES NFR BLD AUTO: 0.1 % (ref 0–0.9)
INR PPP: 1 (ref 0.9–1.1)
LYMPHOCYTES # BLD AUTO: 2.91 X10*3/UL (ref 1.2–4.8)
LYMPHOCYTES NFR BLD AUTO: 40.1 %
MAGNESIUM SERPL-MCNC: 2.16 MG/DL (ref 1.6–2.4)
MCH RBC QN AUTO: 26.2 PG (ref 26–34)
MCHC RBC AUTO-ENTMCNC: 32 G/DL (ref 32–36)
MCV RBC AUTO: 82 FL (ref 80–100)
MONOCYTES # BLD AUTO: 0.38 X10*3/UL (ref 0.1–1)
MONOCYTES NFR BLD AUTO: 5.2 %
NEUTROPHILS # BLD AUTO: 3.8 X10*3/UL (ref 1.2–7.7)
NEUTROPHILS NFR BLD AUTO: 52.4 %
NRBC BLD-RTO: 0 /100 WBCS (ref 0–0)
PLATELET # BLD AUTO: 225 X10*3/UL (ref 150–450)
POTASSIUM SERPL-SCNC: 4.3 MMOL/L (ref 3.5–5.3)
PROT SERPL-MCNC: 8 G/DL (ref 6.4–8.2)
PROTHROMBIN TIME: 11.2 SECONDS (ref 9.8–12.8)
RBC # BLD AUTO: 5.12 X10*6/UL (ref 4–5.2)
SODIUM SERPL-SCNC: 139 MMOL/L (ref 136–145)
WBC # BLD AUTO: 7.3 X10*3/UL (ref 4.4–11.3)

## 2024-09-10 PROCEDURE — 83880 ASSAY OF NATRIURETIC PEPTIDE: CPT | Performed by: NURSE PRACTITIONER

## 2024-09-10 PROCEDURE — 70450 CT HEAD/BRAIN W/O DYE: CPT | Performed by: STUDENT IN AN ORGANIZED HEALTH CARE EDUCATION/TRAINING PROGRAM

## 2024-09-10 PROCEDURE — 36415 COLL VENOUS BLD VENIPUNCTURE: CPT | Performed by: NURSE PRACTITIONER

## 2024-09-10 PROCEDURE — 84484 ASSAY OF TROPONIN QUANT: CPT | Performed by: NURSE PRACTITIONER

## 2024-09-10 PROCEDURE — 70450 CT HEAD/BRAIN W/O DYE: CPT

## 2024-09-10 PROCEDURE — 83735 ASSAY OF MAGNESIUM: CPT | Performed by: NURSE PRACTITIONER

## 2024-09-10 PROCEDURE — 71046 X-RAY EXAM CHEST 2 VIEWS: CPT | Mod: FOREIGN READ | Performed by: RADIOLOGY

## 2024-09-10 PROCEDURE — 71046 X-RAY EXAM CHEST 2 VIEWS: CPT

## 2024-09-10 PROCEDURE — 85610 PROTHROMBIN TIME: CPT | Performed by: NURSE PRACTITIONER

## 2024-09-10 PROCEDURE — 99284 EMERGENCY DEPT VISIT MOD MDM: CPT | Mod: 25

## 2024-09-10 PROCEDURE — 80053 COMPREHEN METABOLIC PANEL: CPT | Performed by: NURSE PRACTITIONER

## 2024-09-10 PROCEDURE — 85025 COMPLETE CBC W/AUTO DIFF WBC: CPT | Performed by: NURSE PRACTITIONER

## 2024-09-10 PROCEDURE — 2500000001 HC RX 250 WO HCPCS SELF ADMINISTERED DRUGS (ALT 637 FOR MEDICARE OP): Performed by: EMERGENCY MEDICINE

## 2024-09-10 RX ORDER — AMOXICILLIN AND CLAVULANATE POTASSIUM 875; 125 MG/1; MG/1
1 TABLET, FILM COATED ORAL EVERY 12 HOURS
Qty: 14 TABLET | Refills: 0 | Status: SHIPPED | OUTPATIENT
Start: 2024-09-10 | End: 2024-09-17

## 2024-09-10 RX ORDER — LIDOCAINE HYDROCHLORIDE 20 MG/ML
15 SOLUTION OROPHARYNGEAL ONCE
Status: COMPLETED | OUTPATIENT
Start: 2024-09-10 | End: 2024-09-10

## 2024-09-10 RX ORDER — ALUMINUM HYDROXIDE, MAGNESIUM HYDROXIDE, AND SIMETHICONE 1200; 120; 1200 MG/30ML; MG/30ML; MG/30ML
30 SUSPENSION ORAL ONCE
Status: COMPLETED | OUTPATIENT
Start: 2024-09-10 | End: 2024-09-10

## 2024-09-10 RX ORDER — AMOXICILLIN AND CLAVULANATE POTASSIUM 875; 125 MG/1; MG/1
1 TABLET, FILM COATED ORAL EVERY 12 HOURS
Qty: 14 TABLET | Refills: 0 | Status: SHIPPED | OUTPATIENT
Start: 2024-09-10 | End: 2024-09-10

## 2024-09-10 RX ORDER — CLONIDINE HYDROCHLORIDE 0.1 MG/1
0.1 TABLET ORAL ONCE
Status: COMPLETED | OUTPATIENT
Start: 2024-09-10 | End: 2024-09-10

## 2024-09-10 ASSESSMENT — COLUMBIA-SUICIDE SEVERITY RATING SCALE - C-SSRS
2. HAVE YOU ACTUALLY HAD ANY THOUGHTS OF KILLING YOURSELF?: NO
1. IN THE PAST MONTH, HAVE YOU WISHED YOU WERE DEAD OR WISHED YOU COULD GO TO SLEEP AND NOT WAKE UP?: NO
6. HAVE YOU EVER DONE ANYTHING, STARTED TO DO ANYTHING, OR PREPARED TO DO ANYTHING TO END YOUR LIFE?: NO

## 2024-09-10 ASSESSMENT — PAIN - FUNCTIONAL ASSESSMENT: PAIN_FUNCTIONAL_ASSESSMENT: 0-10

## 2024-09-10 ASSESSMENT — PAIN SCALES - GENERAL: PAINLEVEL_OUTOF10: 4

## 2024-09-10 NOTE — ED TRIAGE NOTES
Pt to ER via triage, c/o dizziness, headache x 7 days, elevated BP at home. Hx of HTN, pt is on medication. Pt states she felt some numbness in her left hand yesterday and today.

## 2024-09-11 ENCOUNTER — OFFICE VISIT (OUTPATIENT)
Dept: PRIMARY CARE | Facility: CLINIC | Age: 54
End: 2024-09-11
Payer: COMMERCIAL

## 2024-09-11 VITALS
BODY MASS INDEX: 32.47 KG/M2 | TEMPERATURE: 97.7 F | HEART RATE: 73 BPM | SYSTOLIC BLOOD PRESSURE: 144 MMHG | HEIGHT: 60 IN | DIASTOLIC BLOOD PRESSURE: 87 MMHG | WEIGHT: 165.4 LBS

## 2024-09-11 DIAGNOSIS — E78.00 HYPERCHOLESTEREMIA: ICD-10-CM

## 2024-09-11 DIAGNOSIS — E08.9 DIABETES MELLITUS DUE TO UNDERLYING CONDITION, CONTROLLED, WITHOUT COMPLICATION, WITHOUT LONG-TERM CURRENT USE OF INSULIN (MULTI): ICD-10-CM

## 2024-09-11 DIAGNOSIS — I10 BENIGN ESSENTIAL HYPERTENSION: Primary | ICD-10-CM

## 2024-09-11 DIAGNOSIS — I10 ESSENTIAL (PRIMARY) HYPERTENSION: ICD-10-CM

## 2024-09-11 PROCEDURE — 99214 OFFICE O/P EST MOD 30 MIN: CPT | Performed by: INTERNAL MEDICINE

## 2024-09-11 PROCEDURE — 1036F TOBACCO NON-USER: CPT | Performed by: INTERNAL MEDICINE

## 2024-09-11 PROCEDURE — 3079F DIAST BP 80-89 MM HG: CPT | Performed by: INTERNAL MEDICINE

## 2024-09-11 PROCEDURE — 3077F SYST BP >= 140 MM HG: CPT | Performed by: INTERNAL MEDICINE

## 2024-09-11 PROCEDURE — 3008F BODY MASS INDEX DOCD: CPT | Performed by: INTERNAL MEDICINE

## 2024-09-11 PROCEDURE — 3050F LDL-C >= 130 MG/DL: CPT | Performed by: INTERNAL MEDICINE

## 2024-09-11 RX ORDER — ATENOLOL 50 MG/1
50 TABLET ORAL DAILY
Qty: 90 TABLET | Refills: 1 | Status: SHIPPED | OUTPATIENT
Start: 2024-09-11

## 2024-09-11 ASSESSMENT — PATIENT HEALTH QUESTIONNAIRE - PHQ9
SUM OF ALL RESPONSES TO PHQ9 QUESTIONS 1 AND 2: 0
2. FEELING DOWN, DEPRESSED OR HOPELESS: NOT AT ALL
1. LITTLE INTEREST OR PLEASURE IN DOING THINGS: NOT AT ALL

## 2024-09-11 NOTE — PROGRESS NOTES
Assessment/Plan   Problem List Items Addressed This Visit       Benign essential hypertension - Primary    Hypercholesteremia    Diabetes mellitus due to underlying condition, controlled, without complication, without long-term current use of insulin (Multi)     Other Visit Diagnoses       Essential (primary) hypertension        Relevant Medications    atenolol (Tenormin) 50 mg tablet        Benign essential hypertension uncontrolled increase the dose of atenolol to 50 mg continue amlodipine 10 mg hopefully will control the blood pressure  Hypercholesterolemia continue current medication  Diabetes well-controlled without any medication  Follow-up in 8 weeks time advised    Subjective   Patient ID: Ludmila Chowdary is a 53 y.o. female who presents for Follow-up (High BP.).    Past Surgical History:   Procedure Laterality Date    CHOLECYSTECTOMY  09/30/2016    Cholecystectomy    OTHER SURGICAL HISTORY  12/18/2018    Gallbladder surgery      Family History   Problem Relation Name Age of Onset    Arthritis Mother      Hypertension Mother      Hypertension Father        Social History     Socioeconomic History    Marital status:      Spouse name: Not on file    Number of children: Not on file    Years of education: Not on file    Highest education level: Not on file   Occupational History    Not on file   Tobacco Use    Smoking status: Never    Smokeless tobacco: Never   Vaping Use    Vaping status: Never Used   Substance and Sexual Activity    Alcohol use: Never    Drug use: Never    Sexual activity: Not on file   Other Topics Concern    Not on file   Social History Narrative    Not on file     Social Determinants of Health     Financial Resource Strain: Not on file   Food Insecurity: Unknown (7/3/2024)    Received from [facility]    Hunger Vital Sign     Worried About Running Out of Food in the Last Year: Never true     Ran Out of Food in the Last Year: Not on file   Transportation Needs: Not on file   Physical  Activity: Not on file   Stress: Not on file   Social Connections: Not on file   Intimate Partner Violence: Not on file   Housing Stability: Not on file      Other and Pollen extracts   Current Outpatient Medications   Medication Sig Dispense Refill    amLODIPine (Norvasc) 10 mg tablet TAKE 1 TABLET BY MOUTH EVERY DAY FOR BLOOD PRESSURE 90 tablet 0    amoxicillin-pot clavulanate (Augmentin) 875-125 mg tablet Take 1 tablet by mouth every 12 hours for 7 days. 14 tablet 0    atorvastatin (Lipitor) 10 mg tablet Take 1 tablet (10 mg) by mouth once daily. 90 tablet 1    bismuth subsalicylate (Pink Bismuth) 262 mg tablet Take 2 tablets (524 mg) by mouth once daily. 28 tablet 0    blood sugar diagnostic (True Metrix Glucose Test Strip) strip once daily.      ergocalciferol (Vitamin D-2) 1.25 MG (29443 UT) capsule Take by mouth.      guaiFENesin (Mucinex) 600 mg 12 hr tablet Take 1 tablet (600 mg) by mouth 2 times a day. Do not crush, chew, or split. 60 tablet 0    ibuprofen 600 mg tablet Take 1 tablet (600 mg) by mouth every 6 hours if needed for moderate pain (4 - 6).      Lidocaine Pain Relief 4 % patch Place on the skin once daily as needed.      metoclopramide (Reglan) 5 mg tablet Take 1 tablet (5 mg) by mouth 4 times a day.      pantoprazole (ProtoNix) 40 mg EC tablet Take 1 tablet (40 mg) by mouth. Do not crush, chew, or split.      peg 400-propylene glycol (Systane Ultra) 0.4-0.3 % drops ophthalmic drops Administer 1 drop into both eyes 3 times a day. 10 mL 4    propylene glycoL 0.6 % drops Administer into affected eye(s).      sucralfate (Carafate) 1 gram tablet Take 1 tablet (1 g) by mouth 4 times a day before meals.      triamcinolone (Kenalog) 0.1 % cream APPLY TOPICALLY 2 TIMES A DAY. APPLY TO AFFECTED AREA 1-2 TIMES DAILY AS NEEDED. 15 g 0    atenolol (Tenormin) 50 mg tablet Take 1 tablet (50 mg) by mouth once daily. 90 tablet 1    lansoprazole (Prevacid) 30 mg DR capsule Take 1 capsule (30 mg) by mouth once  "daily. Do not crush or chew. (Patient not taking: Reported on 9/11/2024) 30 capsule 2     No current facility-administered medications for this visit.      Vitals:    09/11/24 1640   BP: 144/87   BP Location: Left arm   Patient Position: Sitting   Pulse: 73   Temp: 36.5 °C (97.7 °F)   Weight: 75 kg (165 lb 6.4 oz)   Height: 1.524 m (5')      Problem List Items Addressed This Visit       Benign essential hypertension - Primary    Hypercholesteremia    Diabetes mellitus due to underlying condition, controlled, without complication, without long-term current use of insulin (Multi)     Other Visit Diagnoses       Essential (primary) hypertension        Relevant Medications    atenolol (Tenormin) 50 mg tablet           No orders of the defined types were placed in this encounter.       HPI  This was the visit after the visit to the ED where she was diagnosed with acute sinusitis antibiotic was given but reported blood pressure was elevated significantly and intermittently continuously and when at home with associated headache and that is why she is here  ROS no chest pain no palpitation does have some insomnia sleep lacking  Past medical history reviewed  Social and family history reviewed  Allergies and medications reviewed  Recent labs reviewed  Vital signs reviewed    PHYSICAL EXAM  Exam normal  Chart reviewed      No results found for: \"PR1\", \"BMPR1A\", \"CMPLAS\", \"KW9KJYMI\", \"KPSAT\"   Lab Results   Component Value Date    CHOL 219 (H) 03/13/2024    LDLCALC 157 (H) 03/13/2024    CHHDL 4.6 03/13/2024                "

## 2024-09-11 NOTE — ED PROVIDER NOTES
HPI   No chief complaint on file.      Patient is a pleasant 53-year-old female, medical history significant for hypertension and prior H. pylori, presents to the emergency department intermittent headaches.  She states been going on for about the past week.  She states that she has had elevated blood pressures at home between 160 and 180.  At times, she has a dull pressure behind her eyes.  She denies visual change.  She states occasionally, she will have some chest pain but it hurts worse when she pushes on it.  She denies fever.  She denies chills or sweats.  She is otherwise been in her normal state of health.              Patient History   Past Medical History:   Diagnosis Date    Hypertension     Other specified bacterial intestinal infections 09/30/2016    Positive H. pylori test     Past Surgical History:   Procedure Laterality Date    CHOLECYSTECTOMY  09/30/2016    Cholecystectomy    OTHER SURGICAL HISTORY  12/18/2018    Gallbladder surgery     Family History   Problem Relation Name Age of Onset    Arthritis Mother      Hypertension Mother      Hypertension Father       Social History     Tobacco Use    Smoking status: Never    Smokeless tobacco: Never   Vaping Use    Vaping status: Never Used   Substance Use Topics    Alcohol use: Never    Drug use: Never       Physical Exam   ED Triage Vitals [09/10/24 1839]   Temperature Heart Rate Respirations BP   36.6 °C (97.9 °F) 74 18 169/87      Pulse Ox Temp Source Heart Rate Source Patient Position   99 % Temporal Monitor Sitting      BP Location FiO2 (%)     Right arm --       Physical Exam  Vitals and nursing note reviewed.   Constitutional:       General: She is not in acute distress.     Appearance: She is well-developed.   HENT:      Head: Normocephalic and atraumatic.   Eyes:      Extraocular Movements: Extraocular movements intact.      Conjunctiva/sclera: Conjunctivae normal.      Pupils: Pupils are equal, round, and reactive to light.   Cardiovascular:       Rate and Rhythm: Normal rate and regular rhythm.      Heart sounds: No murmur heard.  Pulmonary:      Effort: Pulmonary effort is normal. No respiratory distress.      Breath sounds: Normal breath sounds.   Abdominal:      Palpations: Abdomen is soft.      Tenderness: There is no abdominal tenderness.   Musculoskeletal:         General: No swelling.      Cervical back: Neck supple.   Skin:     General: Skin is warm and dry.      Capillary Refill: Capillary refill takes less than 2 seconds.   Neurological:      General: No focal deficit present.      Mental Status: She is alert and oriented to person, place, and time.   Psychiatric:         Mood and Affect: Mood normal.           ED Course & MDM   ED Course as of 09/10/24 2114   Tue Sep 10, 2024   2108 CBC, chemistry, troponin, magnesium, BNP all unremarkable. [MK]   2108 CT shows evidence of sinusitis and Arnold-Chiari malformation. [MK]   2109 Chest x-ray demonstrates normal cardiac silhouette without infiltrates or effusions. [MK]      ED Course User Index  [MK] Varinder Ely MD         Diagnoses as of 09/10/24 2114   Acute maxillary sinusitis, recurrence not specified   Hypertension, unspecified type                 No data recorded     [place] Coma Scale Score: 15 (09/10/24 1841 : Claudia Hammond, EMT)                           Medical Decision Making  Medical Decision Making: Patient presents emergency department with elevated blood pressure.  She has been having intermittent headaches.  She has been on amlodipine and atenolol for some time.  On arrival, she is in no distress.  Her neurologic exam is reassuring.  Broad metabolic workup was pursued.  Labs are unremarkable.  Patient underwent CT imaging.  This does show evidence of rather significant sinusitis.  I do feel that this is likely the cause of her headache.  Her labs are unremarkable.  I am hesitant to start her on increasing antihypertensives as she does have infection.  I do feel if we treat  this, she can follow-up with her primary care to discuss more medication.  Patient is agreeable.  EKG interpreted by myself (ED attending physician): Sinus rhythm.  Rate of 84.  Normal axis and intervals.  No acute ischemia    Differential Diagnoses Considered: Hypertensive urgency, hypertensive headache, intracranial mass, electrolyte disturbance      Chronic Medical Conditions Significantly Affecting Care: History of hypertension    External Records Reviewed: I reviewed recent and relevant outside records including: Outpatient medication reconciliation through my understanding    Independent Interpretation of Studies:  I independently interpreted: CT and chest x-ray obtained and reviewed    Escalation of Care:  Appropriate for outpatient management    Social Determinants of Health Significantly Affecting Care:  No social determinant    Prescription Drug Consideration: This Augmentin    Diagnostic testing considered: Noncontributory            Procedure  Procedures     Varinder Ely MD  09/10/24 8809

## 2024-09-13 DIAGNOSIS — K21.9 GASTROESOPHAGEAL REFLUX DISEASE WITHOUT ESOPHAGITIS: Primary | ICD-10-CM

## 2024-09-13 RX ORDER — HYDROGEN PEROXIDE 3 %
40 SOLUTION, NON-ORAL MISCELLANEOUS
Qty: 60 CAPSULE | Refills: 1 | Status: SHIPPED | OUTPATIENT
Start: 2024-09-13 | End: 2025-09-13

## 2024-09-14 DIAGNOSIS — K21.9 GASTROESOPHAGEAL REFLUX DISEASE WITHOUT ESOPHAGITIS: ICD-10-CM

## 2024-09-16 RX ORDER — HYDROGEN PEROXIDE 3 %
20 SOLUTION, NON-ORAL MISCELLANEOUS 2 TIMES DAILY
Qty: 60 CAPSULE | Refills: 1 | Status: SHIPPED | OUTPATIENT
Start: 2024-09-16

## 2024-11-21 ENCOUNTER — APPOINTMENT (OUTPATIENT)
Dept: OPHTHALMOLOGY | Facility: CLINIC | Age: 54
End: 2024-11-21
Payer: COMMERCIAL

## 2024-12-05 DIAGNOSIS — I10 ESSENTIAL (PRIMARY) HYPERTENSION: ICD-10-CM

## 2024-12-05 RX ORDER — AMLODIPINE BESYLATE 10 MG/1
10 TABLET ORAL DAILY
Qty: 90 TABLET | Refills: 0 | Status: SHIPPED | OUTPATIENT
Start: 2024-12-05

## 2025-03-03 DIAGNOSIS — I10 ESSENTIAL (PRIMARY) HYPERTENSION: ICD-10-CM

## 2025-03-04 RX ORDER — AMLODIPINE BESYLATE 10 MG/1
10 TABLET ORAL DAILY
Qty: 90 TABLET | Refills: 3 | Status: SHIPPED | OUTPATIENT
Start: 2025-03-04

## 2025-03-12 ENCOUNTER — APPOINTMENT (OUTPATIENT)
Dept: PRIMARY CARE | Facility: CLINIC | Age: 55
End: 2025-03-12
Payer: COMMERCIAL

## 2025-03-25 DIAGNOSIS — I10 ESSENTIAL (PRIMARY) HYPERTENSION: ICD-10-CM

## 2025-03-27 RX ORDER — ATENOLOL 50 MG/1
50 TABLET ORAL DAILY
Qty: 30 TABLET | Refills: 0 | Status: SHIPPED | OUTPATIENT
Start: 2025-03-27

## 2025-04-10 DIAGNOSIS — I10 ESSENTIAL (PRIMARY) HYPERTENSION: ICD-10-CM

## 2025-04-11 RX ORDER — ATENOLOL 50 MG/1
50 TABLET ORAL DAILY
Qty: 90 TABLET | Refills: 0 | Status: SHIPPED | OUTPATIENT
Start: 2025-04-11

## 2025-04-14 ENCOUNTER — APPOINTMENT (OUTPATIENT)
Dept: PRIMARY CARE | Facility: CLINIC | Age: 55
End: 2025-04-14
Payer: COMMERCIAL

## 2025-04-23 ENCOUNTER — APPOINTMENT (OUTPATIENT)
Dept: PRIMARY CARE | Facility: CLINIC | Age: 55
End: 2025-04-23
Payer: COMMERCIAL

## 2025-04-30 ENCOUNTER — HOSPITAL ENCOUNTER (OUTPATIENT)
Dept: RADIOLOGY | Facility: HOSPITAL | Age: 55
Discharge: HOME | End: 2025-04-30
Payer: COMMERCIAL

## 2025-04-30 ENCOUNTER — APPOINTMENT (OUTPATIENT)
Dept: PRIMARY CARE | Facility: CLINIC | Age: 55
End: 2025-04-30
Payer: COMMERCIAL

## 2025-04-30 VITALS
OXYGEN SATURATION: 97 % | DIASTOLIC BLOOD PRESSURE: 69 MMHG | SYSTOLIC BLOOD PRESSURE: 114 MMHG | TEMPERATURE: 97 F | WEIGHT: 177 LBS | HEIGHT: 60 IN | HEART RATE: 82 BPM | BODY MASS INDEX: 34.75 KG/M2

## 2025-04-30 DIAGNOSIS — E66.811 CLASS 1 OBESITY DUE TO EXCESS CALORIES WITH BODY MASS INDEX (BMI) OF 34.0 TO 34.9 IN ADULT, UNSPECIFIED WHETHER SERIOUS COMORBIDITY PRESENT: ICD-10-CM

## 2025-04-30 DIAGNOSIS — I10 BENIGN ESSENTIAL HYPERTENSION: ICD-10-CM

## 2025-04-30 DIAGNOSIS — R07.89 CHEST HEAVINESS: ICD-10-CM

## 2025-04-30 DIAGNOSIS — E78.00 HYPERCHOLESTEREMIA: ICD-10-CM

## 2025-04-30 DIAGNOSIS — Z82.49 FAMILY HISTORY OF CORONARY ARTERY DISEASE: ICD-10-CM

## 2025-04-30 DIAGNOSIS — R94.31 ABNORMAL EKG: ICD-10-CM

## 2025-04-30 DIAGNOSIS — Z00.00 WELL ADULT HEALTH CHECK: ICD-10-CM

## 2025-04-30 DIAGNOSIS — Z78.9 NEVER SMOKED TOBACCO: ICD-10-CM

## 2025-04-30 DIAGNOSIS — E66.09 CLASS 1 OBESITY DUE TO EXCESS CALORIES WITH BODY MASS INDEX (BMI) OF 34.0 TO 34.9 IN ADULT, UNSPECIFIED WHETHER SERIOUS COMORBIDITY PRESENT: ICD-10-CM

## 2025-04-30 DIAGNOSIS — E08.9 DIABETES MELLITUS DUE TO UNDERLYING CONDITION, CONTROLLED, WITHOUT COMPLICATION, WITHOUT LONG-TERM CURRENT USE OF INSULIN: ICD-10-CM

## 2025-04-30 PROCEDURE — 3074F SYST BP LT 130 MM HG: CPT | Performed by: INTERNAL MEDICINE

## 2025-04-30 PROCEDURE — 93000 ELECTROCARDIOGRAM COMPLETE: CPT | Performed by: INTERNAL MEDICINE

## 2025-04-30 PROCEDURE — 3008F BODY MASS INDEX DOCD: CPT | Performed by: INTERNAL MEDICINE

## 2025-04-30 PROCEDURE — 3078F DIAST BP <80 MM HG: CPT | Performed by: INTERNAL MEDICINE

## 2025-04-30 PROCEDURE — 71046 X-RAY EXAM CHEST 2 VIEWS: CPT

## 2025-04-30 PROCEDURE — 1036F TOBACCO NON-USER: CPT | Performed by: INTERNAL MEDICINE

## 2025-04-30 PROCEDURE — 99214 OFFICE O/P EST MOD 30 MIN: CPT | Performed by: INTERNAL MEDICINE

## 2025-04-30 RX ORDER — TIZANIDINE HYDROCHLORIDE 4 MG/1
4 CAPSULE, GELATIN COATED ORAL 3 TIMES DAILY
COMMUNITY

## 2025-04-30 RX ORDER — LEFLUNOMIDE 10 MG/1
10 TABLET ORAL DAILY
COMMUNITY

## 2025-04-30 RX ORDER — CYCLOBENZAPRINE HCL 10 MG
10 TABLET ORAL 3 TIMES DAILY PRN
COMMUNITY
Start: 2024-05-11

## 2025-04-30 RX ORDER — CELECOXIB 200 MG/1
200 CAPSULE ORAL 2 TIMES DAILY
COMMUNITY
Start: 2025-04-12

## 2025-04-30 ASSESSMENT — PATIENT HEALTH QUESTIONNAIRE - PHQ9
1. LITTLE INTEREST OR PLEASURE IN DOING THINGS: NOT AT ALL
SUM OF ALL RESPONSES TO PHQ9 QUESTIONS 1 AND 2: 0
2. FEELING DOWN, DEPRESSED OR HOPELESS: NOT AT ALL

## 2025-04-30 NOTE — PROGRESS NOTES
Assessment/Plan   Problem List Items Addressed This Visit       Benign essential hypertension    Hypercholesteremia    Relevant Orders    Lipid Panel    Diabetes mellitus due to underlying condition, controlled, without complication, without long-term current use of insulin    Never smoked tobacco     Other Visit Diagnoses         Well adult health check        Relevant Orders    CBC    Comprehensive Metabolic Panel      Family history of coronary artery disease          Chest heaviness        Relevant Orders    ECG 12 lead (Clinic Performed)    Referral to Cardiology    XR chest 2 views      Abnormal EKG        Relevant Orders    Referral to Cardiology      Class 1 obesity due to excess calories with body mass index (BMI) of 34.0 to 34.9 in adult, unspecified whether serious comorbidity present            # Chest pain will need to consult cardiology  Previous cardiac testing in 2023 was negative also she has some left-sided musculoskeletal pain therefore chest x-ray is also advised    She has some arthritis symptoms   pills prescribed by in [place] by physicians is going to be on hold  Labs as ordered  Follow-up in 3    Subjective   Patient ID: Ludmila Chowdary is a 54 y.o. female who presents for Annual Exam.    Surgical History[1]   Family History[2]   Social History     Socioeconomic History    Marital status:      Spouse name: Not on file    Number of children: Not on file    Years of education: Not on file    Highest education level: Not on file   Occupational History    Not on file   Tobacco Use    Smoking status: Never    Smokeless tobacco: Never   Vaping Use    Vaping status: Never Used   Substance and Sexual Activity    Alcohol use: Never    Drug use: Never    Sexual activity: Not on file   Other Topics Concern    Not on file   Social History Narrative    Not on file     Social Drivers of Health     Financial Resource Strain: Not on file   Food Insecurity: Unknown (7/3/2024)    Received from [facility]    Hunger Vital Sign     Worried About Running Out of Food in the Last Year: Never true     Ran Out of Food in the Last Year: Not on file   Transportation Needs: Not on file   Physical Activity: Not on file   Stress: Not on file   Social Connections: Not on file   Intimate Partner Violence: Not on file   Housing Stability: Not on file      Other and Pollen extracts   Current Rx[3]   Vitals:    04/30/25 1102   BP: 114/69   BP Location: Left arm   Patient Position: Sitting   Pulse: 82   Temp: 36.1 °C (97 °F)   TempSrc: Skin   SpO2: 97%   Weight: 80.3 kg (177 lb)   Height: 1.524 m (5')      Problem List Items Addressed This Visit       Benign essential hypertension    Hypercholesteremia    Relevant Orders    Lipid Panel    Diabetes mellitus due to underlying condition, controlled, without complication, without long-term current use of insulin    Never smoked tobacco     Other Visit Diagnoses         Well adult health check        Relevant Orders    CBC    Comprehensive Metabolic Panel      Family history of coronary artery disease          Chest heaviness        Relevant Orders    ECG 12 lead (Clinic Performed)    Referral to Cardiology    XR chest 2 views      Abnormal EKG        Relevant Orders    Referral to Cardiology      Class 1 obesity due to excess calories with body mass index (BMI) of 34.0 to 34.9 in adult, unspecified whether serious comorbidity present               Orders Placed This Encounter   Procedures    XR chest 2 views     Standing Status:   Future     Expected Date:   4/30/2025     Expiration Date:   4/30/2026     Reason for exam::   chest tightness left sided     Radiologist to Determine Optimal Study:   Yes     [facility]   Immediate     Is this exam part of a Research Study? If Yes, link this order to the research study:   No    CBC     Standing Status:   Future     Number of Occurrences:   1     Expected Date:   4/30/2025     Expiration Date:   4/30/2026     [facility]   Immediate    Comprehensive Metabolic Panel     Standing Status:   Future     Number of Occurrences:   1     Expected Date:   4/30/2025     Expiration Date:   4/30/2026     [facility]   Immediate    Lipid Panel     Standing Status:   Future     Number of Occurrences:   1     Expected Date:   4/30/2025     Expiration Date:   4/30/2026     Release result to [facility]:   Immediate    Referral to Cardiology     Standing Status:   Future     Expected Date:   4/30/2025     Expiration Date:   4/30/2026     Referral Priority:   Routine     Referral Type:   Consultation     Referral Reason:   Specialty Services Required     Requested Specialty:   Cardiology     Number of Visits Requested:   1    ECG 12 lead (Clinic Performed)     Reason for Exam::   Chest heaviness on exertion        HPI  ' Patient came with chief complaint that she was having heaviness in the chest on going up stairs  She also mentions some left-sided chest pain that is tender pressure sometimes  She has been on PPI for reflux symptom as prescribed.  She is not taking hydroxychloroquine that was prescribed in [place] for arthritis symptoms    ROS otherwise negative  Past medical history reviewed  Social and family history reviewed  Allergies and medications reviewed  Recent labs reviewed  Vital signs reviewed    PHYSICAL EXAM  Chest abdominal neurological examination normal      No results found for: \"PR1\", \"BMPR1A\", \"CMPLAS\", \"EV1NOJAP\", \"KPSAT\"   Lab Results   Component Value Date    CHOL 219 (H) 03/13/2024    LDLCALC 157 (H) 03/13/2024    CHHDL 4.6 03/13/2024     Lab Results   Component Value Date    TSH 0.71 10/10/2022    HGBA1C 5.8 (A) 01/24/2023              === 09/10/24 ===    CT HEAD WO IV CONTRAST    - Impression -  1. No acute intracranial abnormality.  2. Mild diffuse cerebral volume loss with chronic periventricular  white matter ischemic changes in bilateral cerebral hemispheres,  similar compared to prior CT " examination.  3. Peg like morphology of the cerebellar tonsils with mild extension  into the foramen magnum causing mild crowding of the magnum. These  findings can be associated with Chiari malformation. An MRI can be  obtained for further evaluation.  4. Near complete opacification of left maxillary sinus. Recommend  correlation with sinus disease.        MACRO:  None      Signed by: Penny Roman 9/10/2024 9:06 PM  Dictation workstation:   [id number]    Results for orders placed in visit on 01/27/23    ECHOCARDIOGRAM    Narrative  Ordered by an unspecified provider.                          [1]   Past Surgical History:  Procedure Laterality Date    CHOLECYSTECTOMY  09/30/2016    Cholecystectomy    OTHER SURGICAL HISTORY  12/18/2018    Gallbladder surgery   [2]   Family History  Problem Relation Name Age of Onset    Arthritis Mother      Hypertension Mother      Hypertension Father     [3]   Current Outpatient Medications   Medication Sig Dispense Refill    amLODIPine (Norvasc) 10 mg tablet TAKE 1 TABLET BY MOUTH EVERY DAY FOR BLOOD PRESSURE 90 tablet 3    atenolol (Tenormin) 50 mg tablet TAKE 1 TABLET BY MOUTH EVERY DAY 90 tablet 0    atorvastatin (Lipitor) 10 mg tablet Take 1 tablet (10 mg) by mouth once daily. 90 tablet 1    blood sugar diagnostic (True Metrix Glucose Test Strip) strip once daily.      celecoxib (CeleBREX) 200 mg capsule Take 1 capsule (200 mg) by mouth 2 times a day.      cyclobenzaprine (Flexeril) 10 mg tablet Take 1 tablet (10 mg) by mouth 3 times a day as needed for muscle spasms.      ergocalciferol (Vitamin D-2) 1.25 MG (08418 UT) capsule Take by mouth.      ibuprofen 600 mg tablet Take 1 tablet (600 mg) by mouth every 6 hours if needed for moderate pain (4 - 6).      Lidocaine Pain Relief 4 % patch Place on the skin once daily as needed.      Lubricant Eye, PG-, 0.4-0.3 % drops ophthalmic drops ADMINISTER 1 DROP INTO BOTH EYES 3 TIMES A DAY. 15 mL 0    propylene glycoL 0.6 %  drops Administer into affected eye(s).      leflunomide (Arava) 10 mg tablet Take 1 tablet (10 mg) by mouth once daily.      tiZANidine (Zanaflex) 4 mg capsule Take 1 capsule (4 mg) by mouth 3 times a day.       No current facility-administered medications for this visit.

## 2025-05-02 LAB
ALBUMIN SERPL-MCNC: 4.2 G/DL (ref 3.6–5.1)
ALP SERPL-CCNC: 97 U/L (ref 37–153)
ALT SERPL-CCNC: 17 U/L (ref 6–29)
ANION GAP SERPL CALCULATED.4IONS-SCNC: 11 MMOL/L (CALC) (ref 7–17)
AST SERPL-CCNC: 29 U/L (ref 10–35)
BILIRUB SERPL-MCNC: 1 MG/DL (ref 0.2–1.2)
BUN SERPL-MCNC: 20 MG/DL (ref 7–25)
CALCIUM SERPL-MCNC: 9.7 MG/DL (ref 8.6–10.4)
CHLORIDE SERPL-SCNC: 108 MMOL/L (ref 98–110)
CHOLEST SERPL-MCNC: 167 MG/DL
CHOLEST/HDLC SERPL: 2.5 (CALC)
CO2 SERPL-SCNC: 21 MMOL/L (ref 20–32)
CREAT SERPL-MCNC: 0.92 MG/DL (ref 0.5–1.03)
EGFRCR SERPLBLD CKD-EPI 2021: 75 ML/MIN/1.73M2
ERYTHROCYTE [DISTWIDTH] IN BLOOD BY AUTOMATED COUNT: 13.6 % (ref 11–15)
GLUCOSE SERPL-MCNC: 80 MG/DL (ref 65–99)
HCT VFR BLD AUTO: 46.9 % (ref 35–45)
HDLC SERPL-MCNC: 67 MG/DL
HGB BLD-MCNC: 15.6 G/DL (ref 11.7–15.5)
LDLC SERPL CALC-MCNC: 77 MG/DL (CALC)
MCH RBC QN AUTO: 29.5 PG (ref 27–33)
MCHC RBC AUTO-ENTMCNC: 33.3 G/DL (ref 32–36)
MCV RBC AUTO: 88.8 FL (ref 80–100)
NONHDLC SERPL-MCNC: 100 MG/DL (CALC)
PLATELET # BLD AUTO: 166 THOUSAND/UL (ref 140–400)
PMV BLD REES-ECKER: 10 FL (ref 7.5–12.5)
POTASSIUM SERPL-SCNC: 4.5 MMOL/L (ref 3.5–5.3)
PROT SERPL-MCNC: 6.2 G/DL (ref 6.1–8.1)
RBC # BLD AUTO: 5.28 MILLION/UL (ref 3.8–5.1)
SODIUM SERPL-SCNC: 140 MMOL/L (ref 135–146)
TRIGL SERPL-MCNC: 133 MG/DL
WBC # BLD AUTO: 4.4 THOUSAND/UL (ref 3.8–10.8)

## 2025-05-16 ENCOUNTER — APPOINTMENT (OUTPATIENT)
Dept: CARDIOLOGY | Facility: CLINIC | Age: 55
End: 2025-05-16
Payer: COMMERCIAL

## 2025-05-21 ENCOUNTER — APPOINTMENT (OUTPATIENT)
Dept: PRIMARY CARE | Facility: CLINIC | Age: 55
End: 2025-05-21
Payer: COMMERCIAL

## 2025-06-24 ENCOUNTER — APPOINTMENT (OUTPATIENT)
Dept: PRIMARY CARE | Facility: CLINIC | Age: 55
End: 2025-06-24
Payer: COMMERCIAL

## 2025-06-24 VITALS
WEIGHT: 176.6 LBS | TEMPERATURE: 97.9 F | DIASTOLIC BLOOD PRESSURE: 81 MMHG | SYSTOLIC BLOOD PRESSURE: 129 MMHG | HEIGHT: 60 IN | HEART RATE: 89 BPM | BODY MASS INDEX: 34.67 KG/M2

## 2025-06-24 DIAGNOSIS — E66.811 CLASS 1 OBESITY DUE TO EXCESS CALORIES WITH BODY MASS INDEX (BMI) OF 34.0 TO 34.9 IN ADULT, UNSPECIFIED WHETHER SERIOUS COMORBIDITY PRESENT: ICD-10-CM

## 2025-06-24 DIAGNOSIS — J06.9 UPPER RESPIRATORY TRACT INFECTION, UNSPECIFIED TYPE: ICD-10-CM

## 2025-06-24 DIAGNOSIS — E55.9 VITAMIN D DEFICIENCY: ICD-10-CM

## 2025-06-24 DIAGNOSIS — N63.25 MASS OVERLAPPING MULTIPLE QUADRANTS OF LEFT BREAST: ICD-10-CM

## 2025-06-24 DIAGNOSIS — Z78.9 NEVER SMOKED TOBACCO: ICD-10-CM

## 2025-06-24 DIAGNOSIS — I10 ESSENTIAL (PRIMARY) HYPERTENSION: ICD-10-CM

## 2025-06-24 DIAGNOSIS — R92.8 ABNORMAL MAMMOGRAM OF LEFT BREAST: ICD-10-CM

## 2025-06-24 DIAGNOSIS — E66.09 CLASS 1 OBESITY DUE TO EXCESS CALORIES WITH BODY MASS INDEX (BMI) OF 34.0 TO 34.9 IN ADULT, UNSPECIFIED WHETHER SERIOUS COMORBIDITY PRESENT: ICD-10-CM

## 2025-06-24 PROCEDURE — 1036F TOBACCO NON-USER: CPT | Performed by: INTERNAL MEDICINE

## 2025-06-24 PROCEDURE — 99214 OFFICE O/P EST MOD 30 MIN: CPT | Performed by: INTERNAL MEDICINE

## 2025-06-24 PROCEDURE — 3074F SYST BP LT 130 MM HG: CPT | Performed by: INTERNAL MEDICINE

## 2025-06-24 PROCEDURE — 3008F BODY MASS INDEX DOCD: CPT | Performed by: INTERNAL MEDICINE

## 2025-06-24 PROCEDURE — 3079F DIAST BP 80-89 MM HG: CPT | Performed by: INTERNAL MEDICINE

## 2025-06-24 RX ORDER — ERGOCALCIFEROL 1.25 MG/1
1.25 CAPSULE ORAL
Qty: 3 CAPSULE | Refills: 1 | Status: SHIPPED | OUTPATIENT
Start: 2025-06-24

## 2025-06-24 RX ORDER — AZITHROMYCIN 250 MG/1
TABLET, FILM COATED ORAL
Qty: 6 TABLET | Refills: 0 | Status: SHIPPED | OUTPATIENT
Start: 2025-06-24 | End: 2025-06-29

## 2025-06-24 NOTE — PROGRESS NOTES
Assessment/Plan   Problem List Items Addressed This Visit       Vitamin D deficiency    Relevant Medications    ergocalciferol (Vitamin D-2) 1250 mcg (50,000 units) capsule    Never smoked tobacco    Mass overlapping multiple quadrants of left breast     Other Visit Diagnoses         Essential (primary) hypertension          Abnormal mammogram of left breast        Relevant Orders    Referral to Breast Surgery      Class 1 obesity due to excess calories with body mass index (BMI) of 34.0 to 34.9 in adult, unspecified whether serious comorbidity present          Upper respiratory tract infection, unspecified type        Relevant Medications    azithromycin (Zithromax) 250 mg tablet        Abnormal mammogram with suspicion of malignancy  Urgent consultation for possible biopsy or further surgery  Referral to the breast surgeon has already been done but the appointment is is taking a long time and therefore to expedite  If appointment can be done in the next 7 to 10 days in either the [facility] or the [facility] for the breast surgeon to evaluate and do the needful  Upper respiratory tract infection with improvement antibiotic as prescribed  Hypertension under control  Vitamin D deficiency replenish  Hyperlipidemia continue current medication    Subjective   Patient ID: Ludmila Chowdary is a 54 y.o. female who presents for Follow-up (3 week follow up.).    Surgical History[1]   Family History[2]   Social History     Socioeconomic History    Marital status:      Spouse name: Not on file    Number of children: Not on file    Years of education: Not on file    Highest education level: Not on file   Occupational History    Not on file   Tobacco Use    Smoking status: Never    Smokeless tobacco: Never   Vaping Use    Vaping status: Never Used   Substance and Sexual Activity    Alcohol use: Never    Drug use: Never    Sexual activity: Not on file   Other Topics Concern    Not on file   Social  History Narrative    Not on file     Social Drivers of Health     Financial Resource Strain: Not on file   Food Insecurity: Unknown (7/3/2024)    Received from [facility]    Hunger Vital Sign     Worried About Running Out of Food in the Last Year: Never true     Ran Out of Food in the Last Year: Not on file   Transportation Needs: Not on file   Physical Activity: Not on file   Stress: Not on file   Social Connections: Not on file   Intimate Partner Violence: Not on file   Housing Stability: Not on file      Other and Pollen extracts   Current Rx[3]   Vitals:    06/24/25 1304   BP: 129/81   BP Location: Left arm   Patient Position: Sitting   Pulse: 89   Temp: 36.6 °C (97.9 °F)   Weight: 80.1 kg (176 lb 9.6 oz)   Height: 1.524 m (5')      Problem List Items Addressed This Visit       Vitamin D deficiency    Relevant Medications    ergocalciferol (Vitamin D-2) 1250 mcg (50,000 units) capsule    Never smoked tobacco    Mass overlapping multiple quadrants of left breast     Other Visit Diagnoses         Essential (primary) hypertension          Abnormal mammogram of left breast        Relevant Orders    Referral to Breast Surgery      Class 1 obesity due to excess calories with body mass index (BMI) of 34.0 to 34.9 in adult, unspecified whether serious comorbidity present          Upper respiratory tract infection, unspecified type        Relevant Medications    azithromycin (Zithromax) 250 mg tablet           Orders Placed This Encounter   Procedures    Referral to Breast Surgery     Standing Status:   Future     Expected Date:   6/24/2025     Expiration Date:   6/24/2026     Referral Priority:   Routine     Referral Type:   Consultation     Referral Reason:   Specialty Services Required     Requested Specialty:   Breast Surgery     Number of Visits Requested:   1        HPI  For discussing all the conditions  She had some language issue and needed  with other physician but I could not speak  "with her in her language    ROS discomfort in the left side of the chest which is consistent with abnormal mammogram    PHYSICAL EXAM  Heart sounds regular chest clear  No lymph node palpable      No results found for: \"PR1\", \"BMPR1A\", \"CMPLAS\", \"QI6XVFSQ\", \"KPSAT\"   Lab Results   Component Value Date    CHOL 167 05/01/2025    LDLCALC 77 05/01/2025    CHHDL 2.5 05/01/2025     Lab Results   Component Value Date    TSH 0.71 10/10/2022    HGBA1C 5.8 (A) 01/24/2023              === 09/10/24 ===    CT HEAD WO IV CONTRAST    - Impression -  1. No acute intracranial abnormality.  2. Mild diffuse cerebral volume loss with chronic periventricular  white matter ischemic changes in bilateral cerebral hemispheres,  similar compared to prior CT examination.  3. Peg like morphology of the cerebellar tonsils with mild extension  into the foramen magnum causing mild crowding of the magnum. These  findings can be associated with Chiari malformation. An MRI can be  obtained for further evaluation.  4. Near complete opacification of left maxillary sinus. Recommend  correlation with sinus disease.        MACRO:  None      Signed by: Penny Roman 9/10/2024 9:06 PM  Dictation workstation:   [id number]    Results for orders placed in visit on 01/27/23    ECHOCARDIOGRAM    Narrative  Ordered by an unspecified provider.                          [1]   Past Surgical History:  Procedure Laterality Date    CHOLECYSTECTOMY  09/30/2016    Cholecystectomy    OTHER SURGICAL HISTORY  12/18/2018    Gallbladder surgery   [2]   Family History  Problem Relation Name Age of Onset    Arthritis Mother      Hypertension Mother      Hypertension Father     [3]   Current Outpatient Medications   Medication Sig Dispense Refill    amLODIPine (Norvasc) 10 mg tablet TAKE 1 TABLET BY MOUTH EVERY DAY FOR BLOOD PRESSURE 90 tablet 3    atenolol (Tenormin) 50 mg tablet TAKE 1 TABLET BY MOUTH EVERY DAY 90 tablet 0    atorvastatin (Lipitor) 10 mg tablet Take 1 " tablet (10 mg) by mouth once daily. 90 tablet 1    blood sugar diagnostic (True Metrix Glucose Test Strip) strip once daily.      cyclobenzaprine (Flexeril) 10 mg tablet Take 1 tablet (10 mg) by mouth 3 times a day as needed for muscle spasms.      ibuprofen 600 mg tablet Take 1 tablet (600 mg) by mouth every 6 hours if needed for moderate pain (4 - 6).      Lidocaine Pain Relief 4 % patch Place on the skin once daily as needed.      Lubricant Eye, PG-, 0.4-0.3 % drops ophthalmic drops ADMINISTER 1 DROP INTO BOTH EYES 3 TIMES A DAY. 15 mL 0    propylene glycoL 0.6 % drops Administer into affected eye(s).      tiZANidine (Zanaflex) 4 mg capsule Take 1 capsule (4 mg) by mouth 3 times a day.      azithromycin (Zithromax) 250 mg tablet Take 2 tablets (500 mg) by mouth once daily for 1 day, THEN 1 tablet (250 mg) once daily for 4 days. Take 2 tabs (500 mg) by mouth today, than 1 daily for 4 days. 6 tablet 0    celecoxib (CeleBREX) 200 mg capsule Take 1 capsule (200 mg) by mouth 2 times a day.      ergocalciferol (Vitamin D-2) 1250 mcg (50,000 units) capsule Take 1 capsule (1.25 mg) by mouth every 30 (thirty) days. 3 capsule 1    leflunomide (Arava) 10 mg tablet Take 1 tablet (10 mg) by mouth once daily.       No current facility-administered medications for this visit.

## 2025-06-25 ENCOUNTER — TELEPHONE (OUTPATIENT)
Dept: CARDIAC SURGERY | Facility: HOSPITAL | Age: 55
End: 2025-06-25
Payer: COMMERCIAL

## 2025-06-25 DIAGNOSIS — I10 ESSENTIAL (PRIMARY) HYPERTENSION: ICD-10-CM

## 2025-06-26 RX ORDER — ATENOLOL 50 MG/1
50 TABLET ORAL DAILY
Qty: 90 TABLET | Refills: 0 | Status: SHIPPED | OUTPATIENT
Start: 2025-06-26

## 2025-06-26 RX ORDER — AMLODIPINE BESYLATE 10 MG/1
10 TABLET ORAL DAILY
Qty: 90 TABLET | Refills: 1 | Status: SHIPPED | OUTPATIENT
Start: 2025-06-26

## 2025-08-13 ENCOUNTER — APPOINTMENT (OUTPATIENT)
Dept: PRIMARY CARE | Facility: CLINIC | Age: 55
End: 2025-08-13
Payer: COMMERCIAL

## 2025-08-13 ENCOUNTER — TELEPHONE (OUTPATIENT)
Dept: CARDIOLOGY | Facility: CLINIC | Age: 55
End: 2025-08-13

## 2025-08-29 ENCOUNTER — APPOINTMENT (OUTPATIENT)
Dept: OPHTHALMOLOGY | Facility: CLINIC | Age: 55
End: 2025-08-29
Payer: COMMERCIAL

## 2025-08-29 DIAGNOSIS — H25.13 NUCLEAR SCLEROTIC CATARACT OF BOTH EYES: ICD-10-CM

## 2025-08-29 DIAGNOSIS — H16.223 KERATOCONJUNCTIVITIS SICCA OF BOTH EYES NOT SPECIFIED AS SJOGREN'S: ICD-10-CM

## 2025-08-29 DIAGNOSIS — H02.88B MEIBOMIAN GLAND DYSFUNCTION (MGD) OF UPPER AND LOWER EYELID OF LEFT EYE: ICD-10-CM

## 2025-08-29 DIAGNOSIS — H02.88A: ICD-10-CM

## 2025-08-29 DIAGNOSIS — H52.03 HYPERMETROPIA OF BOTH EYES: Primary | ICD-10-CM

## 2025-08-29 DIAGNOSIS — H52.4 PRESBYOPIA: ICD-10-CM

## 2025-08-29 PROCEDURE — 92014 COMPRE OPH EXAM EST PT 1/>: CPT | Performed by: OPTOMETRIST

## 2025-08-29 PROCEDURE — 92015 DETERMINE REFRACTIVE STATE: CPT | Performed by: OPTOMETRIST

## 2025-08-29 RX ORDER — CYCLOSPORINE 0.5 MG/ML
1 EMULSION OPHTHALMIC 2 TIMES DAILY
Qty: 180 EACH | Refills: 3 | Status: SHIPPED | OUTPATIENT
Start: 2025-08-29 | End: 2026-08-29

## 2025-08-29 ASSESSMENT — REFRACTION
OD_ADD: +2.00
OD_SPHERE: +0.25
OS_CYLINDER: +0.25
OS_AXIS: 021
OD_CYLINDER: SPHERE
OS_ADD: +2.00
OS_SPHERE: +0.50

## 2025-08-29 ASSESSMENT — VISUAL ACUITY
METHOD: SNELLEN - LINEAR
OD_SC+: -1
OS_SC: 20/40
OS_SC+: -2
OD_SC: 20/100+1
OS_SC: 20/100-1
OS_PH_SC+: +2
OS_PH_SC: 20/25
OD_SC: 20/20

## 2025-08-29 ASSESSMENT — ENCOUNTER SYMPTOMS
EYES NEGATIVE: 1
ALLERGIC/IMMUNOLOGIC NEGATIVE: 0
PSYCHIATRIC NEGATIVE: 0
CONSTITUTIONAL NEGATIVE: 0
CARDIOVASCULAR NEGATIVE: 0
MUSCULOSKELETAL NEGATIVE: 0
GASTROINTESTINAL NEGATIVE: 0
RESPIRATORY NEGATIVE: 0
ENDOCRINE NEGATIVE: 0
HEMATOLOGIC/LYMPHATIC NEGATIVE: 0
NEUROLOGICAL NEGATIVE: 0

## 2025-08-29 ASSESSMENT — TONOMETRY
OS_IOP_MMHG: 16
IOP_METHOD: GOLDMANN APPLANATION
OD_IOP_MMHG: 14

## 2025-08-29 ASSESSMENT — REFRACTION_MANIFEST
OS_SPHERE: +1.00
OD_CYLINDER: +0.25
OS_AXIS: 021
OS_CYLINDER: +0.25
OS_SPHERE: +1.00
METHOD_AUTOREFRACTION: 1
OD_SPHERE: +0.75
OS_AXIS: 021
OS_ADD: +2.00
OD_CYLINDER: SPHERE
OD_ADD: +2.00
OD_SPHERE: +0.25
OS_CYLINDER: +0.25
OD_AXIS: 035

## 2025-08-29 ASSESSMENT — CONF VISUAL FIELD
OS_INFERIOR_TEMPORAL_RESTRICTION: 0
OD_INFERIOR_TEMPORAL_RESTRICTION: 0
OD_INFERIOR_NASAL_RESTRICTION: 0
METHOD: COUNTING FINGERS
OD_SUPERIOR_NASAL_RESTRICTION: 0
OS_INFERIOR_NASAL_RESTRICTION: 0
OS_SUPERIOR_NASAL_RESTRICTION: 0
OD_NORMAL: 1
OS_SUPERIOR_TEMPORAL_RESTRICTION: 0
OD_SUPERIOR_TEMPORAL_RESTRICTION: 0
OS_NORMAL: 1

## 2025-08-29 ASSESSMENT — EXTERNAL EXAM - LEFT EYE: OS_EXAM: NORMAL

## 2025-08-29 ASSESSMENT — EXTERNAL EXAM - RIGHT EYE: OD_EXAM: NORMAL

## 2025-08-29 ASSESSMENT — CUP TO DISC RATIO
OD_RATIO: 0.25
OS_RATIO: 0.25

## 2025-10-15 ENCOUNTER — APPOINTMENT (OUTPATIENT)
Dept: PRIMARY CARE | Facility: CLINIC | Age: 55
End: 2025-10-15
Payer: COMMERCIAL

## 2025-12-01 ENCOUNTER — APPOINTMENT (OUTPATIENT)
Dept: OPHTHALMOLOGY | Facility: CLINIC | Age: 55
End: 2025-12-01
Payer: COMMERCIAL